# Patient Record
Sex: FEMALE | Race: WHITE | NOT HISPANIC OR LATINO | URBAN - METROPOLITAN AREA
[De-identification: names, ages, dates, MRNs, and addresses within clinical notes are randomized per-mention and may not be internally consistent; named-entity substitution may affect disease eponyms.]

---

## 2018-10-03 RX ORDER — NORETHINDRONE ACETATE AND ETHINYL ESTRADIOL AND FERROUS FUMARATE 1MG-20(21)
KIT ORAL
Qty: 84 TABLET | Refills: 2 | Status: SHIPPED | OUTPATIENT
Start: 2018-10-03 | End: 2018-10-03 | Stop reason: SDUPTHER

## 2018-10-04 RX ORDER — NORETHINDRONE ACETATE AND ETHINYL ESTRADIOL 1MG-20(21)
1 KIT ORAL
Qty: 84 TABLET | Refills: 2 | Status: SHIPPED | OUTPATIENT
Start: 2018-10-04 | End: 2019-07-23 | Stop reason: SDUPTHER

## 2018-10-04 NOTE — TELEPHONE ENCOUNTER
From: Jacquie Jewell  Sent: 10/3/2018 10:03 PM EDT  Subject: Medication Renewal Request    Jacquie Jewell would like a refill of the following medications:     JUNEL FE 1/20, 28, 1 mg-20 mcg (21)/75 mg (7) per tablet [Tammy Crowell DO]    Preferred pharmacy: [place] [address] [place] [address]  Delivery method: Pickup  Preferred pick-up date and time: 10/6/2018

## 2019-07-23 ENCOUNTER — OFFICE VISIT (OUTPATIENT)
Dept: FAMILY MEDICINE | Facility: CLINIC | Age: 29
End: 2019-07-23
Payer: COMMERCIAL

## 2019-07-23 VITALS
SYSTOLIC BLOOD PRESSURE: 104 MMHG | WEIGHT: 135.8 LBS | BODY MASS INDEX: 21.31 KG/M2 | TEMPERATURE: 98.2 F | HEART RATE: 90 BPM | OXYGEN SATURATION: 99 % | DIASTOLIC BLOOD PRESSURE: 68 MMHG | HEIGHT: 67 IN

## 2019-07-23 DIAGNOSIS — Z00.00 ENCOUNTER FOR GENERAL ADULT MEDICAL EXAMINATION WITHOUT ABNORMAL FINDINGS: Primary | ICD-10-CM

## 2019-07-23 DIAGNOSIS — Z11.3 SCREEN FOR SEXUALLY TRANSMITTED DISEASES: ICD-10-CM

## 2019-07-23 PROCEDURE — 99395 PREV VISIT EST AGE 18-39: CPT | Performed by: FAMILY MEDICINE

## 2019-07-23 RX ORDER — NORETHINDRONE ACETATE AND ETHINYL ESTRADIOL 1MG-20(21)
1 KIT ORAL
Qty: 84 TABLET | Refills: 2 | Status: SHIPPED | OUTPATIENT
Start: 2019-07-23 | End: 2019-10-17 | Stop reason: SDUPTHER

## 2019-07-23 ASSESSMENT — ENCOUNTER SYMPTOMS
VOMITING: 0
VOICE CHANGE: 0
ABDOMINAL PAIN: 0
NUMBNESS: 0
FEVER: 0
PALPITATIONS: 0
SHORTNESS OF BREATH: 0
COUGH: 0
CHILLS: 0
BACK PAIN: 0
DIZZINESS: 0
NERVOUS/ANXIOUS: 0
SORE THROAT: 0
HEMATURIA: 0
SINUS PAIN: 0
DIARRHEA: 0
EYE PAIN: 0
CONSTIPATION: 0
WHEEZING: 0
NAUSEA: 0
DYSURIA: 0

## 2019-07-23 NOTE — PROGRESS NOTES
"Subjective      Patient ID: Jacquie Jewell is a 29 y.o. female.      HPI  Patient presents today for physical exam.    Social History     Social History   • Marital status: Single     Spouse name: N/A   • Number of children: N/A   • Years of education: N/A     Occupational History   •       [employer]     Social History Main Topics   • Smoking status: Never Smoker   • Smokeless tobacco: Never Used   • Alcohol use Yes   • Drug use: No   • Sexual activity: Yes     Partners: Male      Comment: Pill Junell Fe     Other Topics Concern   • Not on file     Social History Narrative   • No narrative on file       Family History   Problem Relation Age of Onset   • Diabetes Maternal Grandmother    • Heart attack Maternal Grandmother    • Cancer Paternal Grandmother        History reviewed. No pertinent surgical history.    History reviewed. No pertinent past medical history.    Goals     None          The following have been reviewed and updated as appropriate in this visit:  Tobacco  Allergies  Meds  Problems  Med Hx  Surg Hx  Fam Hx  Soc Hx        Review of Systems   Constitutional: Negative for chills and fever.   HENT: Negative for ear pain, hearing loss, sinus pain, sneezing, sore throat and voice change.    Eyes: Negative for pain.   Respiratory: Negative for cough, shortness of breath and wheezing.    Cardiovascular: Negative for chest pain and palpitations.   Gastrointestinal: Negative for abdominal pain, constipation, diarrhea, nausea and vomiting.   Genitourinary: Negative for dysuria, hematuria, urgency and vaginal discharge.   Musculoskeletal: Negative for back pain.   Skin: Negative for rash.   Neurological: Negative for dizziness and numbness.   Psychiatric/Behavioral: The patient is not nervous/anxious.        Objective     Vitals:    07/23/19 0819   BP: 104/68   Pulse: 90   Temp: 36.8 °C (98.2 °F)   SpO2: 99%   Weight: 61.6 kg (135 lb 12.8 oz)   Height: 1.702 m (5' 7\")     Body mass " index is 21.27 kg/m².    Physical Exam   Constitutional: She is oriented to person, place, and time. She appears well-developed and well-nourished.   HENT:   Head: Normocephalic and atraumatic.   Right Ear: External ear normal.   Left Ear: External ear normal.   Nose: Nose normal.   Mouth/Throat: Oropharynx is clear and moist. No oropharyngeal exudate.   Eyes: Conjunctivae and EOM are normal.   Neck: Normal range of motion. Neck supple. No thyromegaly present.   Cardiovascular: Normal rate, regular rhythm and normal heart sounds.  Exam reveals no gallop and no friction rub.    No murmur heard.  Pulmonary/Chest: Effort normal and breath sounds normal. No respiratory distress. She has no wheezes. She has no rales.   Abdominal: Soft. Bowel sounds are normal. She exhibits no mass. There is no tenderness. There is no guarding.   Musculoskeletal: Normal range of motion. She exhibits no edema or tenderness.   Lymphadenopathy:     She has no cervical adenopathy.   Neurological: She is alert and oriented to person, place, and time. No cranial nerve deficit.   Skin: Skin is warm and dry. No rash noted. No erythema.   Psychiatric: She has a normal mood and affect.       Assessment/Plan   Problem List Items Addressed This Visit     None      Visit Diagnoses     Encounter for general adult medical examination without abnormal findings    -  Primary    counseled diet/nutrition and exercise    Relevant Orders    HIV 1,2 AB P24 AG    RPR    CBC and Differential    Comprehensive metabolic panel    Lipid panel    Screen for sexually transmitted diseases        Relevant Orders    HIV 1,2 AB P24 AG    RPR    CBC and Differential    Comprehensive metabolic panel    Lipid panel        Gyn - due - information for dr. arnold provided  tdap utd  Dentist - utd

## 2019-07-23 NOTE — PROGRESS NOTES
"Subjective      Patient ID: Jacquie Jewell is a 29 y.o. female.      HPI  Patient presents today for physical exam.    Social History     Social History   • Marital status: Single     Spouse name: N/A   • Number of children: N/A   • Years of education: N/A     Occupational History   •       [employer]     Social History Main Topics   • Smoking status: Never Smoker   • Smokeless tobacco: Never Used   • Alcohol use Yes   • Drug use: No   • Sexual activity: Yes     Partners: Male      Comment: Pill Junell Fe     Other Topics Concern   • Not on file     Social History Narrative   • No narrative on file       Family History   Problem Relation Age of Onset   • Diabetes Maternal Grandmother    • Heart attack Maternal Grandmother    • Cancer Paternal Grandmother        History reviewed. No pertinent surgical history.    History reviewed. No pertinent past medical history.    Goals     None          The following have been reviewed and updated as appropriate in this visit:  Tobacco  Med Hx  Surg Hx  Fam Hx  Soc Hx      Review of Systems   Constitutional: Negative for chills and fever.   HENT: Negative for ear pain, hearing loss, sinus pain, sneezing, sore throat and voice change.    Eyes: Negative for pain.   Respiratory: Negative for cough, shortness of breath and wheezing.    Cardiovascular: Negative for chest pain and palpitations.   Gastrointestinal: Negative for abdominal pain, constipation, diarrhea, nausea and vomiting.   Genitourinary: Negative for dysuria, hematuria, urgency and vaginal discharge.   Musculoskeletal: Negative for back pain.   Skin: Negative for rash.   Neurological: Negative for dizziness and numbness.   Psychiatric/Behavioral: The patient is not nervous/anxious.        Objective     Vitals:    07/23/19 0819   BP: 104/68   Pulse: 90   Temp: 36.8 °C (98.2 °F)   SpO2: 99%   Weight: 61.6 kg (135 lb 12.8 oz)   Height: 1.702 m (5' 7\")     Body mass index is 21.27 kg/m².    Physical " Exam   Constitutional: She is oriented to person, place, and time. She appears well-developed and well-nourished.   HENT:   Head: Normocephalic and atraumatic.   Right Ear: External ear normal.   Left Ear: External ear normal.   Nose: Nose normal.   Mouth/Throat: Oropharynx is clear and moist. No oropharyngeal exudate.   Eyes: Conjunctivae and EOM are normal.   Neck: Normal range of motion. Neck supple. No thyromegaly present.   Cardiovascular: Normal rate, regular rhythm and normal heart sounds.  Exam reveals no gallop and no friction rub.    No murmur heard.  Pulmonary/Chest: Effort normal and breath sounds normal. No respiratory distress. She has no wheezes. She has no rales.   Abdominal: Soft. Bowel sounds are normal. She exhibits no mass. There is no tenderness. There is no guarding.   Musculoskeletal: Normal range of motion. She exhibits no edema.   Lymphadenopathy:     She has no cervical adenopathy.   Neurological: She is alert and oriented to person, place, and time. No cranial nerve deficit.   Skin: Skin is warm and dry. No rash noted. No erythema.   Psychiatric: She has a normal mood and affect. Her behavior is normal.       Assessment/Plan   Problem List Items Addressed This Visit     None      Visit Diagnoses     Encounter for general adult medical examination without abnormal findings    -  Primary    Relevant Orders    HIV 1,2 AB P24 AG    RPR    CBC and Differential    Comprehensive metabolic panel    Lipid panel    Screen for sexually transmitted diseases        Relevant Orders    HIV 1,2 AB P24 AG    RPR    CBC and Differential    Comprehensive metabolic panel    Lipid panel        Gyn - due, information for dr. arnold provided  utd tdap  Dentist - utd

## 2019-07-23 NOTE — PATIENT INSTRUCTIONS
Dr. Haley Patterson  Address: [address] [place] [address] [id number], [id number]  Phone: [phone]

## 2019-08-07 LAB
ALBUMIN SERPL-MCNC: 4.4 G/DL (ref 3.5–5.5)
ALBUMIN/GLOB SERPL: 1.8 {RATIO} (ref 1.2–2.2)
ALP SERPL-CCNC: 41 IU/L (ref 39–117)
ALT SERPL-CCNC: 15 IU/L (ref 0–32)
AST SERPL-CCNC: 20 IU/L (ref 0–40)
BASOPHILS # BLD AUTO: 0 X10E3/UL (ref 0–0.2)
BASOPHILS NFR BLD AUTO: 1 %
BILIRUB SERPL-MCNC: 0.5 MG/DL (ref 0–1.2)
BUN SERPL-MCNC: 6 MG/DL (ref 6–20)
BUN/CREAT SERPL: 8 (ref 9–23)
CALCIUM SERPL-MCNC: 9.5 MG/DL (ref 8.7–10.2)
CHLORIDE SERPL-SCNC: 104 MMOL/L (ref 96–106)
CHOLEST SERPL-MCNC: 156 MG/DL (ref 100–199)
CO2 SERPL-SCNC: 23 MMOL/L (ref 20–29)
CREAT SERPL-MCNC: 0.72 MG/DL (ref 0.57–1)
EOSINOPHIL # BLD AUTO: 0.1 X10E3/UL (ref 0–0.4)
EOSINOPHIL NFR BLD AUTO: 1 %
ERYTHROCYTE [DISTWIDTH] IN BLOOD BY AUTOMATED COUNT: 13.2 % (ref 12.3–15.4)
GLOBULIN SER CALC-MCNC: 2.5 G/DL (ref 1.5–4.5)
GLUCOSE SERPL-MCNC: 87 MG/DL (ref 65–99)
HCT VFR BLD AUTO: 40.1 % (ref 34–46.6)
HDLC SERPL-MCNC: 53 MG/DL
HGB BLD-MCNC: 13.5 G/DL (ref 11.1–15.9)
HIV 1+2 AB+HIV1 P24 AG SERPL QL IA: NON REACTIVE
IMM GRANULOCYTES # BLD AUTO: 0 X10E3/UL (ref 0–0.1)
IMM GRANULOCYTES NFR BLD AUTO: 0 %
LAB CORP EGFR IF AFRICN AM: 130 ML/MIN/1.73
LAB CORP EGFR IF NONAFRICN AM: 113 ML/MIN/1.73
LDLC SERPL CALC-MCNC: 86 MG/DL (ref 0–99)
LYMPHOCYTES # BLD AUTO: 1.9 X10E3/UL (ref 0.7–3.1)
LYMPHOCYTES NFR BLD AUTO: 46 %
MCH RBC QN AUTO: 31.5 PG (ref 26.6–33)
MCHC RBC AUTO-ENTMCNC: 33.7 G/DL (ref 31.5–35.7)
MCV RBC AUTO: 94 FL (ref 79–97)
MONOCYTES # BLD AUTO: 0.3 X10E3/UL (ref 0.1–0.9)
MONOCYTES NFR BLD AUTO: 8 %
NEUTROPHILS # BLD AUTO: 1.8 X10E3/UL (ref 1.4–7)
NEUTROPHILS NFR BLD AUTO: 44 %
PLATELET # BLD AUTO: 306 X10E3/UL (ref 150–450)
POTASSIUM SERPL-SCNC: 4.9 MMOL/L (ref 3.5–5.2)
PROT SERPL-MCNC: 6.9 G/DL (ref 6–8.5)
RBC # BLD AUTO: 4.29 X10E6/UL (ref 3.77–5.28)
RPR SER QL: NON REACTIVE
SODIUM SERPL-SCNC: 138 MMOL/L (ref 134–144)
TRIGL SERPL-MCNC: 86 MG/DL (ref 0–149)
VLDLC SERPL CALC-MCNC: 17 MG/DL (ref 5–40)
WBC # BLD AUTO: 4.1 X10E3/UL (ref 3.4–10.8)

## 2019-10-17 ENCOUNTER — OFFICE VISIT (OUTPATIENT)
Dept: OBSTETRICS AND GYNECOLOGY | Facility: CLINIC | Age: 29
End: 2019-10-17
Payer: COMMERCIAL

## 2019-10-17 VITALS — DIASTOLIC BLOOD PRESSURE: 66 MMHG | BODY MASS INDEX: 20.86 KG/M2 | WEIGHT: 133.2 LBS | SYSTOLIC BLOOD PRESSURE: 124 MMHG

## 2019-10-17 DIAGNOSIS — Z12.4 ROUTINE CERVICAL SMEAR: ICD-10-CM

## 2019-10-17 DIAGNOSIS — Z11.51 SCREENING FOR HPV (HUMAN PAPILLOMAVIRUS): ICD-10-CM

## 2019-10-17 DIAGNOSIS — Z11.3 SCREENING EXAMINATION FOR STD (SEXUALLY TRANSMITTED DISEASE): ICD-10-CM

## 2019-10-17 DIAGNOSIS — Z01.419 WELL WOMAN EXAM WITH ROUTINE GYNECOLOGICAL EXAM: Primary | ICD-10-CM

## 2019-10-17 PROCEDURE — 30000001 HC MISCELLANEOUS TEST: Performed by: OBSTETRICS & GYNECOLOGY

## 2019-10-17 PROCEDURE — 87591 N.GONORRHOEAE DNA AMP PROB: CPT | Performed by: OBSTETRICS & GYNECOLOGY

## 2019-10-17 PROCEDURE — 87491 CHLMYD TRACH DNA AMP PROBE: CPT | Performed by: OBSTETRICS & GYNECOLOGY

## 2019-10-17 PROCEDURE — 87661 TRICHOMONAS VAGINALIS AMPLIF: CPT | Performed by: OBSTETRICS & GYNECOLOGY

## 2019-10-17 PROCEDURE — S0612 ANNUAL GYNECOLOGICAL EXAMINA: HCPCS | Performed by: OBSTETRICS & GYNECOLOGY

## 2019-10-17 PROCEDURE — 87624 HPV HI-RISK TYP POOLED RSLT: CPT | Performed by: OBSTETRICS & GYNECOLOGY

## 2019-10-17 PROCEDURE — G0145 SCR C/V CYTO,THINLAYER,RESCR: HCPCS | Performed by: OBSTETRICS & GYNECOLOGY

## 2019-10-17 RX ORDER — NORETHINDRONE ACETATE AND ETHINYL ESTRADIOL 1MG-20(21)
1 KIT ORAL
Qty: 84 TABLET | Refills: 3 | Status: SHIPPED | OUTPATIENT
Start: 2019-10-17 | End: 2020-08-11 | Stop reason: SDUPTHER

## 2019-10-17 NOTE — PROGRESS NOTES
10/17/2019  Jacquie Jewell is a 29 y.o. y.o. female who presents for annual exam.   Periods are absent on current ocp.  The patient is sexually active. GYN screening history: last pap: was normal. Domestic violence: no.     Current contraception: OCP (estrogen/progesterone)  History of abnormal Pap smear: no  Family history of uterine or ovarian cancer: no  Regular self breast exam: no  History of abnormal mammogram: no  Family history of breast cancer: yes - PGM- 50's ?  History of abnormal lipids: no  Menstrual History:  OB History     G0          No LMP recorded (lmp unknown).         Review of Systems and Physical Exam  The following have been reviewed and updated as appropriate in this visit: Allergies  Meds  Problems  .     Vitals:    10/17/19 0906   BP: 124/66     Weight: 133 pounds   HPI  30 yo G0 new patient presents for routine gyn annual exam who notes wanting STD workup on her pap smear. She had negative STD bloodwork.   Review of Systems  No HA/CP/SOB/abdominal/pelvic pain/dysuria/hematuria/incontinence constipation/diarrhea/vaginal discharge/dyspareunia/  OBGyn Exam  Neck: No thyromegaly or lumps  Heart: Regular rate and rhythm  Lungs: Clear to auscultation bilaterally   Abdomen: Soft, nontender, no masses  Extremities: No calf tenderness or edema  Speculum exam: No vaginal, vulvar, cervical lesions  Vaginal exam: Uterus normal size; no adnexal masses  Breast: No lumps; no lymphadenopathy    Assessment and Plan:  Diagnoses and all orders for this visit:    Well woman exam with routine gynecological exam  -     Cytology, Thinprep Pap  -     norethindrone-e.estradiol-iron (JUNEL FE 1/20, 28,) 1 mg-20 mcg (21)/75 mg (7) per tablet; Take 1 tablet by mouth once daily.  -     THINPREP PAP ([facility])    Routine cervical smear  -     Cytology, Thinprep Pap  -     THINPREP PAP ([facility])    Screening for HPV (human papillomavirus)  -     Cytology, Thinprep Pap  -     THINPREP PAP ([facility])    Screening examination for  STD (sexually transmitted disease)  -     Chlamydia/GC RNA:ThinPrep,Urine,Swab  -     Trichomonas Vaginalis RNA, QL    .  All questions answered..  Follow up in 1 year   Haley Patterson MD

## 2019-10-22 LAB
C TRACH RRNA SPEC QL NAA+PROBE: NOT DETECTED
N GONORRHOEA RRNA SPEC QL NAA+PROBE: NOT DETECTED
QST (ALWAYS MESSAGE): NORMAL
T VAGINALIS RRNA SPEC QL NAA+PROBE: NOT DETECTED

## 2019-10-28 LAB
CASE RPRT: NORMAL
CLINICAL INFO: NORMAL
CLINICAL INFO: NORMAL
LMP START DATE: NORMAL
SPECIMEN PROCESSING COMMENT: NORMAL
THIN PREP CVX: NORMAL

## 2019-10-29 LAB
HPV HR 12 DNA CVX QL NAA+PROBE: NEGATIVE
HPV16 DNA SPEC QL NAA+PROBE: NEGATIVE
HPV18 DNA SPEC QL NAA+PROBE: NEGATIVE

## 2020-03-12 ENCOUNTER — OFFICE VISIT (OUTPATIENT)
Dept: FAMILY MEDICINE | Facility: CLINIC | Age: 30
End: 2020-03-12
Payer: COMMERCIAL

## 2020-03-12 VITALS
HEART RATE: 120 BPM | TEMPERATURE: 99.3 F | HEIGHT: 67 IN | BODY MASS INDEX: 21.53 KG/M2 | OXYGEN SATURATION: 99 % | WEIGHT: 137.2 LBS

## 2020-03-12 DIAGNOSIS — J30.9 ALLERGIC RHINITIS, UNSPECIFIED SEASONALITY, UNSPECIFIED TRIGGER: Primary | ICD-10-CM

## 2020-03-12 PROCEDURE — 99213 OFFICE O/P EST LOW 20 MIN: CPT | Performed by: FAMILY MEDICINE

## 2020-03-12 NOTE — PROGRESS NOTES
Subjective      Patient ID: Jacquie Jewell is a 29 y.o. female.    HPI     Went to [employer]. show 10 - 12 days ago  Dry cough started yesterday - new office building that was [place]  Talking to her mom yesterday on the phone and noticed was getting short of breath, no other episodes  No hx of asthma or ever needing an inhaler  No fever/chills  Doesn't usually suffer from seasonal allergies    The following have been reviewed and updated as appropriate in this visit:  Allergies  Meds  Problems       Review of Systems   Constitutional: Negative for chills, fatigue and fever.   HENT: Negative for congestion, ear pain, postnasal drip, rhinorrhea, sinus pressure, sinus pain and sore throat.    Respiratory: Positive for cough and shortness of breath.    Cardiovascular: Negative for chest pain.       Social History     Socioeconomic History   • Marital status: Single     Spouse name: Not on file   • Number of children: Not on file   • Years of education: Not on file   • Highest education level: Not on file   Occupational History   • Occupation:      Comment: [employer]   Social Needs   • Financial resource strain: Not on file   • Food insecurity:     Worry: Not on file     Inability: Not on file   • Transportation needs:     Medical: Not on file     Non-medical: Not on file   Tobacco Use   • Smoking status: Never Smoker   • Smokeless tobacco: Never Used   Substance and Sexual Activity   • Alcohol use: Yes   • Drug use: No   • Sexual activity: Yes     Partners: Male     Comment: Pill Junell Fe   Lifestyle   • Physical activity:     Days per week: Not on file     Minutes per session: Not on file   • Stress: Not on file   Relationships   • Social connections:     Talks on phone: Not on file     Gets together: Not on file     Attends Mandaeism service: Not on file     Active member of club or organization: Not on file     Attends meetings of clubs or organizations: Not on file     Relationship status:  "Not on file   • Intimate partner violence:     Fear of current or ex partner: Not on file     Emotionally abused: Not on file     Physically abused: Not on file     Forced sexual activity: Not on file   Other Topics Concern   • Not on file   Social History Narrative   • Not on file       Objective     Vitals:    03/12/20 1733   Pulse: (!) 120   Temp: 37.4 °C (99.3 °F)   SpO2: 99%   Weight: 62.2 kg (137 lb 3.2 oz)   Height: 1.702 m (5' 7\")     Body mass index is 21.49 kg/m².    Physical Exam   Constitutional: She is oriented to person, place, and time. She appears well-developed and well-nourished.   HENT:   Head: Normocephalic and atraumatic.   Right Ear: Tympanic membrane, external ear and ear canal normal.   Left Ear: Tympanic membrane, external ear and ear canal normal.   Nose: No rhinorrhea or sinus tenderness. Right sinus exhibits no maxillary sinus tenderness and no frontal sinus tenderness. Left sinus exhibits no maxillary sinus tenderness and no frontal sinus tenderness.   Mouth/Throat: Oropharynx is clear and moist. No oropharyngeal exudate.   Turbinates with clear discharge and moderate enlargement and erythema  Negative tenderness to palpation over the sinuses     Cardiovascular: Normal rate, regular rhythm and normal heart sounds. Exam reveals no gallop and no friction rub.   No murmur heard.  Pulmonary/Chest: Effort normal and breath sounds normal. No stridor. No respiratory distress. She has no wheezes.   Neurological: She is alert and oriented to person, place, and time.   Skin: Skin is warm and dry.   Psychiatric: She has a normal mood and affect. Her behavior is normal.   Vitals reviewed.      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

## 2020-03-13 ASSESSMENT — ENCOUNTER SYMPTOMS
SINUS PRESSURE: 0
RHINORRHEA: 0
FATIGUE: 0
CHILLS: 0
SINUS PAIN: 0
SORE THROAT: 0
SHORTNESS OF BREATH: 1
COUGH: 1
FEVER: 0

## 2020-08-11 DIAGNOSIS — Z01.419 WELL WOMAN EXAM WITH ROUTINE GYNECOLOGICAL EXAM: ICD-10-CM

## 2020-08-11 RX ORDER — NORETHINDRONE ACETATE AND ETHINYL ESTRADIOL 1MG-20(21)
1 KIT ORAL
Qty: 84 TABLET | Refills: 0 | Status: SHIPPED | OUTPATIENT
Start: 2020-08-11 | End: 2020-10-30 | Stop reason: SDUPTHER

## 2020-10-29 ENCOUNTER — TELEPHONE (OUTPATIENT)
Dept: OBSTETRICS AND GYNECOLOGY | Facility: CLINIC | Age: 30
End: 2020-10-29

## 2020-10-30 DIAGNOSIS — Z01.419 WELL WOMAN EXAM WITH ROUTINE GYNECOLOGICAL EXAM: ICD-10-CM

## 2020-10-30 RX ORDER — NORETHINDRONE ACETATE AND ETHINYL ESTRADIOL 1MG-20(21)
1 KIT ORAL
Qty: 84 TABLET | Refills: 0 | Status: SHIPPED | OUTPATIENT
Start: 2020-10-30 | End: 2021-01-19

## 2021-01-19 DIAGNOSIS — Z01.419 WELL WOMAN EXAM WITH ROUTINE GYNECOLOGICAL EXAM: ICD-10-CM

## 2021-01-19 RX ORDER — NORETHINDRONE ACETATE AND ETHINYL ESTRADIOL AND FERROUS FUMARATE 1MG-20(21)
KIT ORAL
Qty: 84 TABLET | Refills: 0 | Status: SHIPPED | OUTPATIENT
Start: 2021-01-19 | End: 2021-02-04 | Stop reason: SDUPTHER

## 2021-02-05 ENCOUNTER — OFFICE VISIT (OUTPATIENT)
Dept: OBSTETRICS AND GYNECOLOGY | Facility: CLINIC | Age: 31
End: 2021-02-05
Payer: COMMERCIAL

## 2021-02-05 VITALS
DIASTOLIC BLOOD PRESSURE: 84 MMHG | WEIGHT: 140.2 LBS | HEIGHT: 67 IN | SYSTOLIC BLOOD PRESSURE: 128 MMHG | BODY MASS INDEX: 22 KG/M2

## 2021-02-05 DIAGNOSIS — Z11.51 SCREENING FOR HPV (HUMAN PAPILLOMAVIRUS): ICD-10-CM

## 2021-02-05 DIAGNOSIS — Z01.419 WELL WOMAN EXAM WITH ROUTINE GYNECOLOGICAL EXAM: Primary | ICD-10-CM

## 2021-02-05 DIAGNOSIS — Z12.4 ROUTINE CERVICAL SMEAR: ICD-10-CM

## 2021-02-05 DIAGNOSIS — N90.89 VULVAR SKIN TAG: ICD-10-CM

## 2021-02-05 PROCEDURE — 87624 HPV HI-RISK TYP POOLED RSLT: CPT | Performed by: OBSTETRICS & GYNECOLOGY

## 2021-02-05 PROCEDURE — 99395 PREV VISIT EST AGE 18-39: CPT | Performed by: OBSTETRICS & GYNECOLOGY

## 2021-02-05 PROCEDURE — G0145 SCR C/V CYTO,THINLAYER,RESCR: HCPCS | Performed by: OBSTETRICS & GYNECOLOGY

## 2021-02-05 RX ORDER — NORETHINDRONE ACETATE AND ETHINYL ESTRADIOL 1MG-20(21)
1 KIT ORAL
Qty: 84 TABLET | Refills: 3 | Status: SHIPPED | OUTPATIENT
Start: 2021-02-05 | End: 2021-07-02 | Stop reason: SDUPTHER

## 2021-02-05 NOTE — PROGRESS NOTES
2/5/2021  Jacquie Jewell is a 30 y.o. y.o. female who presents for annual exam.   Periods are absent on ocps.  The patient is sexually active. GYN screening history: last pap: was normal. Domestic violence: no.     Current contraception: OCP (estrogen/progesterone)  History of abnormal Pap smear: no  Family history of uterine or ovarian cancer: no  Regular self breast exam: no  History of abnormal mammogram: no  Family history of breast cancer: yes - PGM- 50's  History of abnormal lipids: no  Menstrual History:  OB History     G0          No LMP recorded. (Menstrual status: Amenorrhea).         Review of Systems and Physical Exam  The following have been reviewed and updated as appropriate in this visit: Allergies  Meds  Problems  .     Vitals:    02/05/21 1018   BP: 128/84     Weight: 140 pounds   HPI  29 yo G0 presents for routine gyn annual exam who noticed some bumps between her legs x 5-6 months that not painful or itchy. She does have a history of skin tags on other area of her body.   Review of Systems  No HA/CP/SOB/abdominal/pelvic pain/dysuria/hematuria/incontinence constipation/diarrhea/vaginal discharge/dyspareunia/  OBGyn Exam  Neck: No thyromegaly or lumps  Heart: Regular rate and rhythm  Lungs: Clear to auscultation bilaterally   Abdomen: Soft, nontender, no masses  Extremities: No calf tenderness or edema  Speculum exam: No vaginal,or cervical lesions; + left groin skin tags x 3 (smooth texture)  Vaginal exam: Uterus normal size; no adnexal masses  Breast: No lumps; no lymphadenopathy    Assessment and Plan:  Diagnoses and all orders for this visit:    Well woman exam with routine gynecological exam  -     norethindrone-e.estradioL-iron (JUNEL FE 1/20, 28,) 1 mg-20 mcg (21)/75 mg (7) per tablet; Take 1 tablet by mouth once daily for 28 days.    Routine cervical smear  -     Cytology, Thinprep Pap  -     THINPREP PAP ([facility])    Screening for HPV (human papillomavirus)  -     Cytology, Thinprep  Pap  -     THINPREP PAP ([facility])    Vulvar skin tag    .pt declined intervention for now due to low suspicion of genital warts- pt will call back if skin tags are growing or if symptomatic   All questions answered..  Follow up 1 year   Haley Patterson MD

## 2021-02-15 LAB
CASE RPRT: NORMAL
CLINICAL INFO: NORMAL
CLINICAL INFO: NORMAL
HPV HR 12 DNA CVX QL NAA+PROBE: NEGATIVE
HPV16 DNA SPEC QL NAA+PROBE: NEGATIVE
HPV18 DNA SPEC QL NAA+PROBE: NEGATIVE
LMP START DATE: NORMAL
SPECIMEN PROCESSING COMMENT: NORMAL
THIN PREP CVX: NORMAL

## 2022-07-01 DIAGNOSIS — Z01.419 WELL WOMAN EXAM WITH ROUTINE GYNECOLOGICAL EXAM: ICD-10-CM

## 2022-07-03 DIAGNOSIS — Z01.419 WELL WOMAN EXAM WITH ROUTINE GYNECOLOGICAL EXAM: ICD-10-CM

## 2022-07-03 RX ORDER — NORETHINDRONE ACETATE AND ETHINYL ESTRADIOL 1MG-20(21)
1 KIT ORAL
Qty: 28 TABLET | Refills: 0 | Status: SHIPPED | OUTPATIENT
Start: 2022-07-03 | End: 2022-07-05

## 2022-07-05 RX ORDER — NORETHINDRONE ACETATE AND ETHINYL ESTRADIOL AND FERROUS FUMARATE 1MG-20(21)
KIT ORAL
Qty: 84 TABLET | Refills: 2 | Status: SHIPPED | OUTPATIENT
Start: 2022-07-05 | End: 2022-08-11 | Stop reason: SDUPTHER

## 2022-08-11 ENCOUNTER — OFFICE VISIT (OUTPATIENT)
Dept: OBSTETRICS AND GYNECOLOGY | Facility: CLINIC | Age: 32
End: 2022-08-11
Payer: COMMERCIAL

## 2022-08-11 VITALS
WEIGHT: 146 LBS | BODY MASS INDEX: 22.13 KG/M2 | DIASTOLIC BLOOD PRESSURE: 70 MMHG | SYSTOLIC BLOOD PRESSURE: 110 MMHG | HEIGHT: 68 IN

## 2022-08-11 DIAGNOSIS — Z01.419 WELL WOMAN EXAM WITH ROUTINE GYNECOLOGICAL EXAM: Primary | ICD-10-CM

## 2022-08-11 PROCEDURE — 99395 PREV VISIT EST AGE 18-39: CPT | Performed by: OBSTETRICS & GYNECOLOGY

## 2022-08-11 PROCEDURE — 3008F BODY MASS INDEX DOCD: CPT | Performed by: OBSTETRICS & GYNECOLOGY

## 2022-08-11 RX ORDER — NORETHINDRONE ACETATE AND ETHINYL ESTRADIOL 1MG-20(21)
1 KIT ORAL
Qty: 84 TABLET | Refills: 3 | Status: SHIPPED | OUTPATIENT
Start: 2022-08-11 | End: 2023-09-05

## 2022-08-11 NOTE — PROGRESS NOTES
8/11/2022  Jacquie Jewell is a 32 y.o. y.o. female who presents for annual exam.   Periods are absent due to ocp    The patient is sexually active. GYN screening history: last pap: was normal. Domestic violence: no.     Current contraception: OCP (estrogen/progesterone)  History of abnormal Pap smear: no  Family history of uterine or ovarian cancer: no  Regular self breast exam: no  History of abnormal mammogram: no  Family history of breast cancer: yes - PGM- 50's  History of abnormal lipids: no  Pap smear: 2/2021 negative cells and HPV    Menstrual History:  OB History     G0          No LMP recorded (lmp unknown). (Menstrual status: Amenorrhea).         Review of Systems and Physical Exam  The following have been reviewed and updated as appropriate in this visit: Allergies  Meds  Problems  .     Vitals:    08/11/22 1253   BP: 110/70     Weight: 146 pounds   HPI  31 yo G0 presents for routine gyn annual exam who denies any issues or concerns at this time.   Review of Systems  No HA/CP/SOB/abdominal/pelvic pain/dysuria/hematuria/incontinence constipation/diarrhea/vaginal discharge/dyspareunia/  OBGyn Exam  Neck: No thyromegaly or lumps  Heart: Regular rate and rhythm  Lungs: Clear to auscultation bilaterally   Abdomen: Soft, nontender, no masses  Extremities: No calf tenderness or edema  Speculum exam: No vaginal, vulvar, cervical lesions  Vaginal exam: Uterus normal size; no adnexal masses  Breast: No lumps; no lymphadenopathy    Assessment and Plan:  Diagnoses and all orders for this visit:    Well woman exam with routine gynecological exam  -     norethindrone-e.estradioL-iron (JUNEL FE 1/20, 28,) 1 mg-20 mcg (21)/75 mg (7) per tablet; Take 1 tablet by mouth once daily.    .pap smear not indicated this year   All questions answered..  Follow up in 1 year   Haley Patterson MD

## 2023-02-24 ENCOUNTER — OFFICE VISIT (OUTPATIENT)
Dept: FAMILY MEDICINE | Facility: CLINIC | Age: 33
End: 2023-02-24
Payer: COMMERCIAL

## 2023-02-24 VITALS
BODY MASS INDEX: 22.82 KG/M2 | HEIGHT: 67 IN | DIASTOLIC BLOOD PRESSURE: 62 MMHG | WEIGHT: 145.4 LBS | TEMPERATURE: 98 F | HEART RATE: 95 BPM | SYSTOLIC BLOOD PRESSURE: 118 MMHG | OXYGEN SATURATION: 100 %

## 2023-02-24 DIAGNOSIS — Z00.00 ENCOUNTER FOR PREVENTIVE CARE: Primary | ICD-10-CM

## 2023-02-24 DIAGNOSIS — Z13.220 SCREENING, LIPID: ICD-10-CM

## 2023-02-24 DIAGNOSIS — Z76.89 ESTABLISHING CARE WITH NEW DOCTOR, ENCOUNTER FOR: ICD-10-CM

## 2023-02-24 DIAGNOSIS — Z30.41 ORAL CONTRACEPTIVE PILL SURVEILLANCE: ICD-10-CM

## 2023-02-24 PROCEDURE — 99395 PREV VISIT EST AGE 18-39: CPT | Performed by: STUDENT IN AN ORGANIZED HEALTH CARE EDUCATION/TRAINING PROGRAM

## 2023-02-24 PROCEDURE — 3008F BODY MASS INDEX DOCD: CPT | Performed by: STUDENT IN AN ORGANIZED HEALTH CARE EDUCATION/TRAINING PROGRAM

## 2023-02-24 NOTE — PROGRESS NOTES
Subjective      Patient ID: Jacquie Jewell is a 33 y.o. female here for the following:   Establish Care      HPI    SUBJECTIVE:   33 y.o. female for annual routine checkup.    Specific diet: Standard American diet  Exercise program: No formal routine   Sleep hours per night: Adequate  Dentist twice yearly: Yes  Optometrist or Opthalmologist annually?  Yes    Vaccines:  COVID? No problems    Do you wear your seatbelt? y  Do you wear sunscreen? y  Smoke detectors? y  Feel safe in your home? y    History of abnormal pap?  No   Did you receive HPV vaccine? y     Desire STI testing? No     History of abnormal mammogram? NA     Immunization History   Administered Date(s) Administered   • Influenza Vaccine Quadrivalent Preservative Free 3 Yr And Up 10/10/2020   • Influenza, Unspecified 10/06/2021   • SARS-COV-2 (COVID19) VACCINE, PFIZER 2021, 2021   • Tdap 2016      Menstrual History:  OB History        0    Para   0    Term   0       0    AB   0    Living   0       SAB   0    IAB   0    Ectopic   0    Multiple   0    Live Births   0                No LMP recorded. (Menstrual status: Amenorrhea).       The following have been reviewed and updated as appropriate in this visit:      Allergies  Meds  Problems  Social History      Patient Active Problem List   Diagnosis   • Encounter for preventive care    .    Social History     Tobacco Use   • Smoking status: Never   • Smokeless tobacco: Never   Vaping Use   • Vaping status: Never Used   Substance Use Topics   • Alcohol use: Yes     Comment: occasionally   • Drug use: No     Social History     Social History Narrative   • Not on file       Family History   Problem Relation Age of Onset   • Sjogren's syndrome Biological Mother         trying to get into   • Diabetes Maternal Grandmother    • Heart attack Maternal Grandmother    • Cancer Paternal Grandmother    • Breast cancer Paternal Grandmother    • Colon cancer Neg Hx    • Ovarian cancer  "Neg Hx        Allergies as of 02/24/2023 - Reviewed 02/24/2023   Allergen Reaction Noted   • Cephalexin Hives        Current Outpatient Medications   Medication Sig Dispense Refill   • norethindrone-e.estradioL-iron (JUNEL FE 1/20, 28,) 1 mg-20 mcg (21)/75 mg (7) per tablet Take 1 tablet by mouth once daily. 84 tablet 3     No current facility-administered medications for this visit.         Review of Systems    ROS:  Feeling well. No dyspnea or chest pain on exertion.  No abdominal pain, change in bowel habits, black or bloody stools.  No urinary tract symptoms. No changes to hair, skin, or nails. No neurological complaints.    Objective   Vitals:   Vitals:    02/24/23 1456   BP: 118/62   BP Location: Left upper arm   Patient Position: Sitting   Pulse: 95   Temp: 36.7 °C (98 °F)   TempSrc: Temporal   SpO2: 100%   Weight: 66 kg (145 lb 6.4 oz)   Height: 1.71 m (5' 7.32\")     Body mass index is 22.55 kg/m².  No LMP recorded. (Menstrual status: Amenorrhea).    No results found.    Physical Exam    The patient appears well, alert, oriented x 3, in no distress.  ENT normal.  Neck supple. No adenopathy or thyromegaly.  FARNAZ. Sclera anicteric.  No ocular discharge, pink conjunctiva  Lungs are clear, good air entry, no wheezes, rhonchi or rales.   S1 and S2 normal, no murmurs, regular rate and rhythm.  Abdomen soft without tenderness, guarding, mass or organomegaly.   Extremities show no edema, normal peripheral pulses.   Neurological is normal, no focal findings. CN 2-12 intact. Moves all extremities spontaneously.    Psych: pleasant and cooperative    ASSESSMENT & PLAN      Problem List Items Addressed This Visit        Other    Encounter for preventive care - Primary     ASSESSMENT:   well woman    PLAN:   Mammogram at 38 yo   CRC screen at 43 yo   pap smear- per ob   counseled on breast self exam and family planning choices  additional lab tests per orders  return annually or prn  Counseled on importance of regular " exercise including both cardio and resistance training and necessity of varied diet and regular exercise           Relevant Orders    CBC and differential (Completed)    Lipid panel (Completed)    Comprehensive metabolic panel (Completed)   Other Visit Diagnoses     Establishing care with new doctor, encounter for        Oral contraceptive pill surveillance        Relevant Orders    CBC and differential (Completed)    Comprehensive metabolic panel (Completed)    Screening, lipid        Relevant Orders    Lipid panel (Completed)        Patient Instructions   Diet: Eat a balanced diet of fruits, vegetables, and protein such as lean meats, fish, nuts, beans, quinoa. Limit your intake of carbs such as bread and pasta, and when you do eat these, try to stick with whole grains with higher amounts of fiber. When cooking, try to use olive oil instead of butter, which is high in saturated fat.     Goal Exercise: Aim to exercise 3-5 days per week for 30-60 minutes at moderate intensity. Some suggestions include cardio, weight training, and swimming, but the most important thing is flexibility: do whatever you like and enjoy so that you will keep doing it. The recommended minimums for weekly aerobic activity include 150 minutes of  moderate-intensity aerobic activity such as brisk walking, 75 minutes of vigorous-intensity aerobic activity such as jogging or running, or an equivalent mix of moderate- and vigorous-intensity aerobic activity. In addition, muscle-strengthening activities that work all major muscle groups (legs, hips, back, abdomen, chest, shoulders, and arms) should be performed 2 or more days/week.    Skin: Always use sunscreen with SPF>15 when exposed to the sun. Try to avoid prolonged exposure. Check skin moles regularly, and you may want to see a dermatologist regularly.    Health maintenance: Visit your dentist every 6 months for cleanings and dental exams. Visit your optometrist or ophthalmologist every year  for an eye exam.    Safety: seatbelts, helmets, and no texting while driving.        _____________________  Karen Lobo,   02/24/23

## 2023-02-24 NOTE — PATIENT INSTRUCTIONS
Diet: Eat a balanced diet of fruits, vegetables, and protein such as lean meats, fish, nuts, beans, quinoa. Limit your intake of carbs such as bread and pasta, and when you do eat these, try to stick with whole grains with higher amounts of fiber. When cooking, try to use olive oil instead of butter, which is high in saturated fat.     Goal Exercise: Aim to exercise 3-5 days per week for 30-60 minutes at moderate intensity. Some suggestions include cardio, weight training, and swimming, but the most important thing is flexibility: do whatever you like and enjoy so that you will keep doing it. The recommended minimums for weekly aerobic activity include 150 minutes of  moderate-intensity aerobic activity such as brisk walking, 75 minutes of vigorous-intensity aerobic activity such as jogging or running, or an equivalent mix of moderate- and vigorous-intensity aerobic activity. In addition, muscle-strengthening activities that work all major muscle groups (legs, hips, back, abdomen, chest, shoulders, and arms) should be performed 2 or more days/week.    Skin: Always use sunscreen with SPF>15 when exposed to the sun. Try to avoid prolonged exposure. Check skin moles regularly, and you may want to see a dermatologist regularly.    Health maintenance: Visit your dentist every 6 months for cleanings and dental exams. Visit your optometrist or ophthalmologist every year for an eye exam.    Safety: seatbelts, helmets, and no texting while driving.

## 2023-03-07 LAB
BASOPHILS # BLD AUTO: 0.1 X10E3/UL (ref 0–0.2)
BASOPHILS NFR BLD AUTO: 1 %
EOSINOPHIL # BLD AUTO: 0.1 X10E3/UL (ref 0–0.4)
EOSINOPHIL NFR BLD AUTO: 2 %
ERYTHROCYTE [DISTWIDTH] IN BLOOD BY AUTOMATED COUNT: 12.2 % (ref 11.7–15.4)
HCT VFR BLD AUTO: 39.7 % (ref 34–46.6)
HGB BLD-MCNC: 13.8 G/DL (ref 11.1–15.9)
IMM GRANULOCYTES # BLD AUTO: 0 X10E3/UL (ref 0–0.1)
IMM GRANULOCYTES NFR BLD AUTO: 0 %
LYMPHOCYTES # BLD AUTO: 1.9 X10E3/UL (ref 0.7–3.1)
LYMPHOCYTES NFR BLD AUTO: 38 %
MCH RBC QN AUTO: 31.4 PG (ref 26.6–33)
MCHC RBC AUTO-ENTMCNC: 34.8 G/DL (ref 31.5–35.7)
MCV RBC AUTO: 90 FL (ref 79–97)
MONOCYTES # BLD AUTO: 0.4 X10E3/UL (ref 0.1–0.9)
MONOCYTES NFR BLD AUTO: 7 %
NEUTROPHILS # BLD AUTO: 2.7 X10E3/UL (ref 1.4–7)
NEUTROPHILS NFR BLD AUTO: 52 %
PLATELET # BLD AUTO: 241 X10E3/UL (ref 150–450)
RBC # BLD AUTO: 4.4 X10E6/UL (ref 3.77–5.28)
WBC # BLD AUTO: 5.1 X10E3/UL (ref 3.4–10.8)

## 2023-03-08 LAB
ALBUMIN SERPL-MCNC: 4.3 G/DL (ref 3.8–4.8)
ALBUMIN/GLOB SERPL: 1.9 {RATIO} (ref 1.2–2.2)
ALP SERPL-CCNC: 50 IU/L (ref 44–121)
ALT SERPL-CCNC: 13 IU/L (ref 0–32)
AST SERPL-CCNC: 18 IU/L (ref 0–40)
BILIRUB SERPL-MCNC: 0.3 MG/DL (ref 0–1.2)
BUN SERPL-MCNC: 9 MG/DL (ref 6–20)
BUN/CREAT SERPL: 13 (ref 9–23)
CALCIUM SERPL-MCNC: 9.2 MG/DL (ref 8.7–10.2)
CHLORIDE SERPL-SCNC: 106 MMOL/L (ref 96–106)
CHOLEST SERPL-MCNC: 174 MG/DL (ref 100–199)
CO2 SERPL-SCNC: 22 MMOL/L (ref 20–29)
CREAT SERPL-MCNC: 0.71 MG/DL (ref 0.57–1)
EGFRCR SERPLBLD CKD-EPI 2021: 115 ML/MIN/1.73
GLOBULIN SER CALC-MCNC: 2.3 G/DL (ref 1.5–4.5)
GLUCOSE SERPL-MCNC: 82 MG/DL (ref 70–99)
HDLC SERPL-MCNC: 47 MG/DL
LDLC SERPL CALC-MCNC: 110 MG/DL (ref 0–99)
POTASSIUM SERPL-SCNC: 4.2 MMOL/L (ref 3.5–5.2)
PROT SERPL-MCNC: 6.6 G/DL (ref 6–8.5)
SODIUM SERPL-SCNC: 140 MMOL/L (ref 134–144)
TRIGL SERPL-MCNC: 90 MG/DL (ref 0–149)
VLDLC SERPL CALC-MCNC: 17 MG/DL (ref 5–40)

## 2023-03-09 ENCOUNTER — TELEPHONE (OUTPATIENT)
Dept: FAMILY MEDICINE | Facility: CLINIC | Age: 33
End: 2023-03-09
Payer: COMMERCIAL

## 2023-03-09 DIAGNOSIS — Z00.00 ENCOUNTER FOR PREVENTIVE CARE: Primary | ICD-10-CM

## 2023-03-09 DIAGNOSIS — Z11.3 ROUTINE SCREENING FOR STI (SEXUALLY TRANSMITTED INFECTION): ICD-10-CM

## 2023-03-09 NOTE — TELEPHONE ENCOUNTER
Please call and see if we can add on HIV and hepatitis C testing thank you    Diagnoses and all orders for this visit:    Encounter for preventive care (Primary)  -     HIV 1,2 AB P24 AG; Future  -     Hepatitis C antibody; Future    Routine screening for STI (sexually transmitted infection)  -     HIV 1,2 AB P24 AG; Future  -     Hepatitis C antibody; Future

## 2023-03-10 LAB — HCV IGG SERPL QL IA: NON REACTIVE

## 2023-03-27 ENCOUNTER — APPOINTMENT (OUTPATIENT)
Dept: URBAN - METROPOLITAN AREA CLINIC 203 | Age: 33
Setting detail: DERMATOLOGY
End: 2023-03-29

## 2023-03-27 DIAGNOSIS — L91.8 OTHER HYPERTROPHIC DISORDERS OF THE SKIN: ICD-10-CM

## 2023-03-27 DIAGNOSIS — L82.1 OTHER SEBORRHEIC KERATOSIS: ICD-10-CM

## 2023-03-27 DIAGNOSIS — L70.0 ACNE VULGARIS: ICD-10-CM

## 2023-03-27 DIAGNOSIS — L82.0 INFLAMED SEBORRHEIC KERATOSIS: ICD-10-CM

## 2023-03-27 PROCEDURE — OTHER COUNSELING: OTHER

## 2023-03-27 PROCEDURE — OTHER DEFER: OTHER

## 2023-03-27 PROCEDURE — OTHER PRESCRIPTION: OTHER

## 2023-03-27 PROCEDURE — 99204 OFFICE O/P NEW MOD 45 MIN: CPT | Mod: 25

## 2023-03-27 PROCEDURE — 17110 DESTRUCT B9 LESION 1-14: CPT

## 2023-03-27 PROCEDURE — OTHER PRESCRIPTION MEDICATION MANAGEMENT: OTHER

## 2023-03-27 PROCEDURE — OTHER LIQUID NITROGEN: OTHER

## 2023-03-27 RX ORDER — AZELAIC ACID 0.15 G/G
GEL TOPICAL QD
Qty: 50 | Refills: 3 | Status: ERX | COMMUNITY
Start: 2023-03-27

## 2023-03-27 ASSESSMENT — LOCATION DETAILED DESCRIPTION DERM
LOCATION DETAILED: SUPERIOR MID FOREHEAD
LOCATION DETAILED: RIGHT FOREHEAD
LOCATION DETAILED: LEFT FOREHEAD
LOCATION DETAILED: RIGHT POPLITEAL SKIN
LOCATION DETAILED: LEFT INFERIOR CENTRAL MALAR CHEEK
LOCATION DETAILED: LEFT LATERAL CANTHUS

## 2023-03-27 ASSESSMENT — PAIN INTENSITY VAS: HOW INTENSE IS YOUR PAIN 0 BEING NO PAIN, 10 BEING THE MOST SEVERE PAIN POSSIBLE?: NO PAIN

## 2023-03-27 ASSESSMENT — LOCATION SIMPLE DESCRIPTION DERM
LOCATION SIMPLE: RIGHT FOREHEAD
LOCATION SIMPLE: LEFT EYELID
LOCATION SIMPLE: SUPERIOR FOREHEAD
LOCATION SIMPLE: LEFT CHEEK
LOCATION SIMPLE: RIGHT POPLITEAL SKIN
LOCATION SIMPLE: LEFT FOREHEAD

## 2023-03-27 ASSESSMENT — LOCATION ZONE DERM
LOCATION ZONE: EYELID
LOCATION ZONE: FACE
LOCATION ZONE: LEG

## 2023-03-27 NOTE — PROCEDURE: DEFER
Size Of Lesion In Cm (Optional): 0
Detail Level: Detailed
Introduction Text (Please End With A Colon): The following procedure was deferred:
Instructions (Optional): Cosmetic destruction with cautery

## 2023-03-27 NOTE — PROCEDURE: PRESCRIPTION MEDICATION MANAGEMENT
Plan: Sending rx azelaic acid topical gel. Apply pea size amount qd to face. \\n\\nPt will try topical foam sample
Initiate Treatment: Azelaic acid topical gel
Detail Level: Zone
Render In Strict Bullet Format?: No
Samples Given: Finacea, la roche posay matte

## 2023-03-31 LAB — SPECIMEN STATUS: NORMAL

## 2023-04-19 NOTE — ASSESSMENT & PLAN NOTE
ASSESSMENT:   well woman    PLAN:   Mammogram at 38 yo   CRC screen at 43 yo   pap smear- per ob   counseled on breast self exam and family planning choices  additional lab tests per orders  return annually or prn  Counseled on importance of regular exercise including both cardio and resistance training and necessity of varied diet and regular exercise

## 2023-04-20 ENCOUNTER — APPOINTMENT (OUTPATIENT)
Dept: URBAN - METROPOLITAN AREA CLINIC 203 | Age: 33
Setting detail: DERMATOLOGY
End: 2023-04-23

## 2023-04-20 DIAGNOSIS — L91.8 OTHER HYPERTROPHIC DISORDERS OF THE SKIN: ICD-10-CM

## 2023-04-20 DIAGNOSIS — L82.0 INFLAMED SEBORRHEIC KERATOSIS: ICD-10-CM

## 2023-04-20 PROCEDURE — OTHER BENIGN DESTRUCTION COSMETIC: OTHER

## 2023-04-20 ASSESSMENT — LOCATION DETAILED DESCRIPTION DERM
LOCATION DETAILED: RIGHT INFERIOR ANTERIOR NECK
LOCATION DETAILED: LEFT SUPERIOR CENTRAL BUCCAL CHEEK
LOCATION DETAILED: LEFT LATERAL INFERIOR EYELID
LOCATION DETAILED: LEFT PROXIMAL POSTERIOR UPPER ARM
LOCATION DETAILED: LEFT POSTERIOR SHOULDER
LOCATION DETAILED: LEFT CENTRAL LATERAL NECK
LOCATION DETAILED: LEFT INFERIOR LATERAL NECK
LOCATION DETAILED: RIGHT CLAVICULAR NECK
LOCATION DETAILED: RIGHT LATERAL SUPERIOR CHEST
LOCATION DETAILED: LEFT SUPERIOR ANTERIOR NECK
LOCATION DETAILED: LEFT ANTERIOR SHOULDER
LOCATION DETAILED: LEFT CENTRAL BUCCAL CHEEK
LOCATION DETAILED: LEFT INFERIOR ANTERIOR NECK
LOCATION DETAILED: RIGHT ANTERIOR SHOULDER
LOCATION DETAILED: RIGHT INFERIOR LATERAL NECK
LOCATION DETAILED: LEFT CLAVICULAR NECK
LOCATION DETAILED: RIGHT CENTRAL LATERAL NECK

## 2023-04-20 ASSESSMENT — LOCATION ZONE DERM
LOCATION ZONE: EYELID
LOCATION ZONE: ARM
LOCATION ZONE: FACE
LOCATION ZONE: TRUNK
LOCATION ZONE: NECK

## 2023-04-20 ASSESSMENT — LOCATION SIMPLE DESCRIPTION DERM
LOCATION SIMPLE: CHEST
LOCATION SIMPLE: LEFT CHEEK
LOCATION SIMPLE: LEFT UPPER ARM
LOCATION SIMPLE: NECK
LOCATION SIMPLE: LEFT SHOULDER
LOCATION SIMPLE: LEFT ANTERIOR NECK
LOCATION SIMPLE: RIGHT ANTERIOR NECK
LOCATION SIMPLE: LEFT INFERIOR EYELID
LOCATION SIMPLE: RIGHT SHOULDER

## 2023-04-20 NOTE — PROCEDURE: BENIGN DESTRUCTION COSMETIC
Detail Level: Detailed
Anesthesia Volume In Cc: 0.5
Consent: The patient's consent was obtained including but not limited to risks of crusting, scabbing, blistering, scarring, darker or lighter pigmentary change, recurrence, incomplete removal and infection.
Post-Care Instructions: I reviewed with the patient in detail post-care instructions. Patient is to wear sunprotection, and avoid picking at any of the treated lesions. Pt may apply Vaseline to crusted or scabbing areas.
Price (Use Numbers Only, No Special Characters Or $): 190.40

## 2023-06-01 ENCOUNTER — APPOINTMENT (OUTPATIENT)
Dept: URBAN - METROPOLITAN AREA CLINIC 203 | Age: 33
Setting detail: DERMATOLOGY
End: 2023-07-01

## 2023-06-01 DIAGNOSIS — D485 NEOPLASM OF UNCERTAIN BEHAVIOR OF SKIN: ICD-10-CM

## 2023-06-01 PROBLEM — D48.5 NEOPLASM OF UNCERTAIN BEHAVIOR OF SKIN: Status: ACTIVE | Noted: 2023-06-01

## 2023-06-01 PROCEDURE — OTHER PHOTO-DOCUMENTATION: OTHER

## 2023-06-01 PROCEDURE — 11103 TANGNTL BX SKIN EA SEP/ADDL: CPT

## 2023-06-01 PROCEDURE — OTHER BIOPSY BY SHAVE METHOD: OTHER

## 2023-06-01 PROCEDURE — 11102 TANGNTL BX SKIN SINGLE LES: CPT

## 2023-06-01 ASSESSMENT — LOCATION DETAILED DESCRIPTION DERM
LOCATION DETAILED: LEFT LATERAL ABDOMEN
LOCATION DETAILED: PERIUMBILICAL SKIN

## 2023-06-01 ASSESSMENT — LOCATION SIMPLE DESCRIPTION DERM: LOCATION SIMPLE: ABDOMEN

## 2023-06-01 ASSESSMENT — LOCATION ZONE DERM: LOCATION ZONE: TRUNK

## 2023-06-29 ENCOUNTER — APPOINTMENT (OUTPATIENT)
Dept: URBAN - METROPOLITAN AREA CLINIC 203 | Age: 33
Setting detail: DERMATOLOGY
End: 2023-06-30

## 2023-06-29 DIAGNOSIS — L30.1 DYSHIDROSIS [POMPHOLYX]: ICD-10-CM

## 2023-06-29 DIAGNOSIS — L24 IRRITANT CONTACT DERMATITIS: ICD-10-CM

## 2023-06-29 DIAGNOSIS — L73.8 OTHER SPECIFIED FOLLICULAR DISORDERS: ICD-10-CM

## 2023-06-29 PROBLEM — L24.9 IRRITANT CONTACT DERMATITIS, UNSPECIFIED CAUSE: Status: ACTIVE | Noted: 2023-06-29

## 2023-06-29 PROCEDURE — OTHER COUNSELING: OTHER

## 2023-06-29 PROCEDURE — OTHER PRESCRIPTION MEDICATION MANAGEMENT: OTHER

## 2023-06-29 PROCEDURE — OTHER BENIGN DESTRUCTION COSMETIC: OTHER

## 2023-06-29 PROCEDURE — OTHER PRESCRIPTION: OTHER

## 2023-06-29 PROCEDURE — 99213 OFFICE O/P EST LOW 20 MIN: CPT

## 2023-06-29 RX ORDER — TRIAMCINOLONE ACETONIDE 1 MG/G
CREAM TOPICAL BID
Qty: 80 | Refills: 3 | Status: ERX | COMMUNITY
Start: 2023-06-29

## 2023-06-29 ASSESSMENT — LOCATION DETAILED DESCRIPTION DERM
LOCATION DETAILED: NASAL SUPRATIP
LOCATION DETAILED: RIGHT PROXIMAL PRETIBIAL REGION
LOCATION DETAILED: RIGHT RADIAL DORSAL HAND
LOCATION DETAILED: NASAL DORSUM
LOCATION DETAILED: LEFT ULNAR DORSAL HAND

## 2023-06-29 ASSESSMENT — LOCATION SIMPLE DESCRIPTION DERM
LOCATION SIMPLE: RIGHT HAND
LOCATION SIMPLE: NOSE
LOCATION SIMPLE: LEFT HAND
LOCATION SIMPLE: RIGHT PRETIBIAL REGION

## 2023-06-29 ASSESSMENT — LOCATION ZONE DERM
LOCATION ZONE: HAND
LOCATION ZONE: NOSE
LOCATION ZONE: LEG

## 2023-06-29 NOTE — PROCEDURE: BENIGN DESTRUCTION COSMETIC
Post-Care Instructions: I reviewed with the patient in detail post-care instructions. Patient is to wear sunprotection, and avoid picking at any of the treated lesions. Pt may apply Vaseline to crusted or scabbing areas.
Consent: The patient's consent was obtained including but not limited to risks of crusting, scabbing, blistering, scarring, darker or lighter pigmentary change, recurrence, incomplete removal and infection.
Anesthesia Volume In Cc: 0.5
Detail Level: Detailed
Price (Use Numbers Only, No Special Characters Or $): 152.60

## 2023-06-29 NOTE — PROCEDURE: PRESCRIPTION MEDICATION MANAGEMENT
Render In Strict Bullet Format?: No
Detail Level: Zone
Initiate Treatment: Triamcinolone bid prn
Initiate Treatment: Triamcinolone x 2 weeks then prn

## 2023-09-01 DIAGNOSIS — Z01.419 WELL WOMAN EXAM WITH ROUTINE GYNECOLOGICAL EXAM: ICD-10-CM

## 2023-09-05 RX ORDER — NORETHINDRONE ACETATE AND ETHINYL ESTRADIOL AND FERROUS FUMARATE 1MG-20(21)
1 KIT ORAL DAILY
Qty: 84 TABLET | Refills: 3 | Status: SHIPPED | OUTPATIENT
Start: 2023-09-05 | End: 2024-02-27

## 2023-11-10 ENCOUNTER — APPOINTMENT (OUTPATIENT)
Dept: URBAN - METROPOLITAN AREA CLINIC 203 | Age: 33
Setting detail: DERMATOLOGY
End: 2023-11-21

## 2023-11-10 DIAGNOSIS — L91.8 OTHER HYPERTROPHIC DISORDERS OF THE SKIN: ICD-10-CM

## 2023-11-10 DIAGNOSIS — L73.8 OTHER SPECIFIED FOLLICULAR DISORDERS: ICD-10-CM

## 2023-11-10 DIAGNOSIS — D22 MELANOCYTIC NEVI: ICD-10-CM

## 2023-11-10 PROBLEM — D22.39 MELANOCYTIC NEVI OF OTHER PARTS OF FACE: Status: ACTIVE | Noted: 2023-11-10

## 2023-11-10 PROCEDURE — 99212 OFFICE O/P EST SF 10 MIN: CPT | Mod: 25

## 2023-11-10 PROCEDURE — 11310 SHAVE SKIN LESION 0.5 CM/<: CPT

## 2023-11-10 PROCEDURE — 11310 SHAVE SKIN LESION 0.5 CM/<: CPT | Mod: 76

## 2023-11-10 PROCEDURE — OTHER PRESCRIPTION MEDICATION MANAGEMENT: OTHER

## 2023-11-10 PROCEDURE — OTHER BENIGN DESTRUCTION COSMETIC: OTHER

## 2023-11-10 PROCEDURE — OTHER SHAVE REMOVAL (NO PATHOLOGY): OTHER

## 2023-11-10 ASSESSMENT — LOCATION SIMPLE DESCRIPTION DERM
LOCATION SIMPLE: LEFT CHEEK
LOCATION SIMPLE: GLABELLA
LOCATION SIMPLE: NECK
LOCATION SIMPLE: RIGHT CHEEK
LOCATION SIMPLE: LEFT ANTERIOR NECK

## 2023-11-10 ASSESSMENT — LOCATION ZONE DERM
LOCATION ZONE: FACE
LOCATION ZONE: NECK

## 2023-11-10 ASSESSMENT — LOCATION DETAILED DESCRIPTION DERM
LOCATION DETAILED: RIGHT MID PREAURICULAR CHEEK
LOCATION DETAILED: LEFT CLAVICULAR NECK
LOCATION DETAILED: LEFT INFERIOR LATERAL NECK
LOCATION DETAILED: GLABELLA
LOCATION DETAILED: LEFT INFERIOR ANTERIOR NECK
LOCATION DETAILED: LEFT LATERAL MANDIBULAR CHEEK

## 2023-11-10 NOTE — PROCEDURE: BENIGN DESTRUCTION COSMETIC
Detail Level: Detailed
Price (Use Numbers Only, No Special Characters Or $): 152.60
Anesthesia Volume In Cc: 0.5
Post-Care Instructions: I reviewed with the patient in detail post-care instructions. Patient is to wear sunprotection, and avoid picking at any of the treated lesions. Pt may apply Vaseline to crusted or scabbing areas.
Consent: The patient's consent was obtained including but not limited to risks of crusting, scabbing, blistering, scarring, darker or lighter pigmentary change, recurrence, incomplete removal and infection.

## 2024-02-27 ENCOUNTER — OFFICE VISIT (OUTPATIENT)
Dept: FAMILY MEDICINE | Facility: CLINIC | Age: 34
End: 2024-02-27
Payer: COMMERCIAL

## 2024-02-27 VITALS
DIASTOLIC BLOOD PRESSURE: 68 MMHG | OXYGEN SATURATION: 99 % | TEMPERATURE: 97.3 F | BODY MASS INDEX: 23.23 KG/M2 | SYSTOLIC BLOOD PRESSURE: 118 MMHG | HEART RATE: 91 BPM | WEIGHT: 148 LBS | HEIGHT: 67 IN

## 2024-02-27 DIAGNOSIS — L20.84 INTRINSIC ECZEMA: ICD-10-CM

## 2024-02-27 DIAGNOSIS — E78.00 ELEVATED LDL CHOLESTEROL LEVEL: ICD-10-CM

## 2024-02-27 DIAGNOSIS — Z00.00 ENCOUNTER FOR PREVENTIVE CARE: Primary | ICD-10-CM

## 2024-02-27 DIAGNOSIS — Z11.3 ROUTINE SCREENING FOR STI (SEXUALLY TRANSMITTED INFECTION): ICD-10-CM

## 2024-02-27 PROCEDURE — 3008F BODY MASS INDEX DOCD: CPT | Performed by: STUDENT IN AN ORGANIZED HEALTH CARE EDUCATION/TRAINING PROGRAM

## 2024-02-27 PROCEDURE — 99395 PREV VISIT EST AGE 18-39: CPT | Performed by: STUDENT IN AN ORGANIZED HEALTH CARE EDUCATION/TRAINING PROGRAM

## 2024-02-27 RX ORDER — TRIAMCINOLONE ACETONIDE 1 MG/G
1 OINTMENT TOPICAL 2 TIMES DAILY
Qty: 30 G | Refills: 1 | Status: SHIPPED | OUTPATIENT
Start: 2024-02-27 | End: 2025-03-03 | Stop reason: SDUPTHER

## 2024-02-27 ASSESSMENT — PATIENT HEALTH QUESTIONNAIRE - PHQ9: SUM OF ALL RESPONSES TO PHQ9 QUESTIONS 1 & 2: 0

## 2024-02-27 NOTE — PROGRESS NOTES
Subjective      Patient ID: Jacquie Jewell is a 33 y.o. female here for the following:   Annual Exam      HPI    SUBJECTIVE:   33 y.o. female for annual routine checkup.    Reports increased itching in hands   Saw dermatologist who prescribed steroid cream for hand eczema   Has noticed more itching radiating from hands into torso and small acne bumps on forehead  Dermatologist recommended retinol for forehead, pt plans to see dermatologist again     Specific diet: Standard American diet  Exercise: [employer] class once a week, used to walk more during the pandemic and would like to restart  Sleep hours per night: Adequate  Dentist twice yearly: Yes  Optometrist or Opthalmologist annually?  Yes    Vaccines:  COVID? Not UTD  Influenza? Not UTD    Do you wear your seatbelt? y  Do you wear sunscreen? y  Smoke detectors? y  Feel safe in your home? y    History of abnormal pap?  No   Did you receive HPV vaccine? y     Desire STI testing? No     History of abnormal mammogram? N/A     Off birth control since September  Reports regular menstrual periods since     Immunization History   Administered Date(s) Administered   • Influenza Vaccine Quadrivalent Preservative Free 3 Yr And Up 10/10/2020   • Influenza, Unspecified 10/06/2021   • SARS-COV-2 (COVID19) VACCINE, PFIZER MONOVALENT 2021, 2021   • Tdap 2016      Menstrual History:  OB History        0    Para   0    Term   0       0    AB   0    Living   0       SAB   0    IAB   0    Ectopic   0    Multiple   0    Live Births   0                No LMP recorded. (Menstrual status: Amenorrhea).       The following have been reviewed and updated as appropriate in this visit:      Allergies  Meds  Problems  Social History      Patient Active Problem List   Diagnosis   • Encounter for preventive care   • Elevated LDL cholesterol level   • Intrinsic eczema    .    Social History     Tobacco Use   • Smoking status: Never   • Smokeless tobacco: Never  "  Vaping Use   • Vaping Use: Never used   Substance Use Topics   • Alcohol use: Yes     Comment: occasionally   • Drug use: No     Social History     Social History Narrative    [employer] class 1x a week        Family History   Problem Relation Age of Onset   • Sjogren's syndrome Biological Mother         trying to get into   • Hyperlipidemia Biological Father    • Hyperlipidemia Biological Brother    • Diabetes Maternal Grandmother    • Heart attack Maternal Grandmother    • Cancer Paternal Grandmother    • Breast cancer Paternal Grandmother    • Colon cancer Neg Hx    • Ovarian cancer Neg Hx        Allergies as of 02/27/2024 - Reviewed 02/27/2024   Allergen Reaction Noted   • Cephalexin Hives        Current Outpatient Medications   Medication Sig Dispense Refill   • triamcinolone (KENALOG) 0.1 % ointment Apply 1 Application topically 2 (two) times a day. 30 g 1     No current facility-administered medications for this visit.       Review of Systems    ROS:  Feeling well. No dyspnea or chest pain on exertion.  No abdominal pain, change in bowel habits, black or bloody stools.  No urinary tract symptoms. No changes to hair, skin, or nails. No neurological complaints.    Objective   Vitals:   Vitals:    02/27/24 1503   BP: 118/68   BP Location: Left upper arm   Patient Position: Sitting   Pulse: 91   Temp: 36.3 °C (97.3 °F)   TempSrc: Temporal   SpO2: 99%   Weight: 67.1 kg (148 lb)   Height: 1.702 m (5' 7\")     Body mass index is 23.18 kg/m².  No LMP recorded. (Menstrual status: Amenorrhea).    No results found.    Physical Exam    The patient appears well, alert, oriented x 3, in no distress.  ENT normal.  Neck supple. No adenopathy or thyromegaly.  FARNAZ. Sclera anicteric.  No ocular discharge, pink conjunctiva  Lungs are clear, good air entry, no wheezes, rhonchi or rales.   S1 and S2 normal, no murmurs, regular rate and rhythm.  Abdomen soft without tenderness, guarding, mass or organomegaly.   Extremities show no " edema, normal peripheral pulses.   Neurological is normal, no focal findings. CN 2-12 intact. Moves all extremities spontaneously.    Psych: pleasant and cooperative    ASSESSMENT & PLAN    Problem List Items Addressed This Visit        Infectious/Inflammatory    Intrinsic eczema     Prescribed Kenalog cream   Follow-up with dermatologist         Relevant Medications    triamcinolone (KENALOG) 0.1 % ointment       Other    Encounter for preventive care - Primary     ASSESSMENT:   well woman    PLAN:   Mammogram at 38 yo   CRC screen at 43 yo   pap smear- per ob   counseled on breast self exam and family planning choices  Recommended she take prenatal vitamins  additional lab tests per orders  return annually or prn  Counseled on importance of regular exercise including both cardio and resistance training and necessity of varied diet and regular exercise           Relevant Orders    Comprehensive metabolic panel    CBC and Differential    Lipid panel    Hepatitis C antibody    RPR    HIV 1,2 AB P24 AG    Chlamydia/GC RNA:ThinPrep/Urine/Swab    Elevated LDL cholesterol level     Stable   Check labs         Relevant Orders    Comprehensive metabolic panel    CBC and Differential    Lipid panel   Other Visit Diagnoses     Routine screening for STI (sexually transmitted infection)        Relevant Orders    Hepatitis C antibody    RPR    HIV 1,2 AB P24 AG    Chlamydia/GC RNA:ThinPrep/Urine/Swab        Patient Instructions   Diet: Eat a balanced diet of fruits, vegetables, and protein such as lean meats, fish, nuts, beans, quinoa. Limit your intake of carbs such as bread and pasta, and when you do eat these, try to stick with whole grains with higher amounts of fiber. When cooking, try to use olive oil instead of butter, which is high in saturated fat.     Prenatal with Vit d 8577-5549 units daily + folic acid 0.4 mcg     Goal Exercise: Aim to exercise 3-5 days per week for 30-60 minutes at moderate intensity. Some  suggestions include cardio, weight training, and swimming, but the most important thing is flexibility: do whatever you like and enjoy so that you will keep doing it. The recommended minimums for weekly aerobic activity include 150 minutes of  moderate-intensity aerobic activity such as brisk walking, 75 minutes of vigorous-intensity aerobic activity such as jogging or running, or an equivalent mix of moderate- and vigorous-intensity aerobic activity. In addition, muscle-strengthening activities that work all major muscle groups (legs, hips, back, abdomen, chest, shoulders, and arms) should be performed 2 or more days/week.    Skin: Always use sunscreen with SPF>15 when exposed to the sun. Try to avoid prolonged exposure. Check skin moles regularly, and you may want to see a dermatologist regularly.    Health maintenance: Visit your dentist every 6 months for cleanings and dental exams. Visit your optometrist or ophthalmologist every year for an eye exam.    Safety: seatbelts, helmets, and no texting while driving.        By signing my name below, I, Lalitha Jernigan, attest that this documentation has been prepared under the direction and in the presence of Karen Finnegan DO      2/27/2024 3:30 PM  Lalitha Jernigan       This note was completed in part utilizing a voice recognition software and in part with a scribe. Grammatical errors, random word insertion, spelling mistakes, and incomplete sentences may be an occasional consequence of the system secondary to software limitations, ambient noise and hardware issues. Please read the chart carefully and recognize, using context, where substitutions have occurred. If you have any questions or concerns about the context, text or information contained within the body of this dictation, please contact me, the provider, for further clarification.      Karen Finnegan D.O.  Family Medicine at [facility] 02/27/24

## 2024-02-27 NOTE — PATIENT INSTRUCTIONS
Diet: Eat a balanced diet of fruits, vegetables, and protein such as lean meats, fish, nuts, beans, quinoa. Limit your intake of carbs such as bread and pasta, and when you do eat these, try to stick with whole grains with higher amounts of fiber. When cooking, try to use olive oil instead of butter, which is high in saturated fat.     Prenatal with Vit d 4704-6797 units daily + folic acid 0.4 mcg     Goal Exercise: Aim to exercise 3-5 days per week for 30-60 minutes at moderate intensity. Some suggestions include cardio, weight training, and swimming, but the most important thing is flexibility: do whatever you like and enjoy so that you will keep doing it. The recommended minimums for weekly aerobic activity include 150 minutes of  moderate-intensity aerobic activity such as brisk walking, 75 minutes of vigorous-intensity aerobic activity such as jogging or running, or an equivalent mix of moderate- and vigorous-intensity aerobic activity. In addition, muscle-strengthening activities that work all major muscle groups (legs, hips, back, abdomen, chest, shoulders, and arms) should be performed 2 or more days/week.    Skin: Always use sunscreen with SPF>15 when exposed to the sun. Try to avoid prolonged exposure. Check skin moles regularly, and you may want to see a dermatologist regularly.    Health maintenance: Visit your dentist every 6 months for cleanings and dental exams. Visit your optometrist or ophthalmologist every year for an eye exam.    Safety: seatbelts, helmets, and no texting while driving.

## 2024-02-27 NOTE — ASSESSMENT & PLAN NOTE
ASSESSMENT:   well woman    PLAN:   Mammogram at 40 yo   CRC screen at 45 yo   pap smear- per ob   counseled on breast self exam and family planning choices  Recommended she take prenatal vitamins  additional lab tests per orders  return annually or prn  Counseled on importance of regular exercise including both cardio and resistance training and necessity of varied diet and regular exercise

## 2024-03-13 LAB
HCV IGG SERPL QL IA: NON REACTIVE
HIV 1+2 AB+HIV1 P24 AG SERPL QL IA: NON REACTIVE
RPR SER QL: NON REACTIVE

## 2024-03-14 LAB
C TRACH RRNA SPEC QL NAA+PROBE: NEGATIVE
N GONORRHOEA RRNA SPEC QL NAA+PROBE: NEGATIVE

## 2024-04-10 LAB
BASOPHILS # BLD AUTO: 0 X10E3/UL (ref 0–0.2)
BASOPHILS NFR BLD AUTO: 1 %
EOSINOPHIL # BLD AUTO: 0.1 X10E3/UL (ref 0–0.4)
EOSINOPHIL NFR BLD AUTO: 2 %
ERYTHROCYTE [DISTWIDTH] IN BLOOD BY AUTOMATED COUNT: 11.9 % (ref 11.7–15.4)
HCT VFR BLD AUTO: 42.8 % (ref 34–46.6)
HGB BLD-MCNC: 13.9 G/DL (ref 11.1–15.9)
IMM GRANULOCYTES # BLD AUTO: 0 X10E3/UL (ref 0–0.1)
IMM GRANULOCYTES NFR BLD AUTO: 0 %
LYMPHOCYTES # BLD AUTO: 1.8 X10E3/UL (ref 0.7–3.1)
LYMPHOCYTES NFR BLD AUTO: 34 %
MCH RBC QN AUTO: 30.2 PG (ref 26.6–33)
MCHC RBC AUTO-ENTMCNC: 32.5 G/DL (ref 31.5–35.7)
MCV RBC AUTO: 93 FL (ref 79–97)
MONOCYTES # BLD AUTO: 0.4 X10E3/UL (ref 0.1–0.9)
MONOCYTES NFR BLD AUTO: 7 %
NEUTROPHILS # BLD AUTO: 3 X10E3/UL (ref 1.4–7)
NEUTROPHILS NFR BLD AUTO: 56 %
PLATELET # BLD AUTO: 278 X10E3/UL (ref 150–450)
RBC # BLD AUTO: 4.6 X10E6/UL (ref 3.77–5.28)
WBC # BLD AUTO: 5.4 X10E3/UL (ref 3.4–10.8)

## 2024-04-11 LAB
ALBUMIN SERPL-MCNC: 4.6 G/DL (ref 3.9–4.9)
ALBUMIN/GLOB SERPL: 1.8 {RATIO} (ref 1.2–2.2)
ALP SERPL-CCNC: 64 IU/L (ref 44–121)
ALT SERPL-CCNC: 17 IU/L (ref 0–32)
AST SERPL-CCNC: 21 IU/L (ref 0–40)
BILIRUB SERPL-MCNC: 0.8 MG/DL (ref 0–1.2)
BUN SERPL-MCNC: 13 MG/DL (ref 6–20)
BUN/CREAT SERPL: 16 (ref 9–23)
CALCIUM SERPL-MCNC: 9.4 MG/DL (ref 8.7–10.2)
CHLORIDE SERPL-SCNC: 103 MMOL/L (ref 96–106)
CHOLEST SERPL-MCNC: 184 MG/DL (ref 100–199)
CO2 SERPL-SCNC: 21 MMOL/L (ref 20–29)
CREAT SERPL-MCNC: 0.82 MG/DL (ref 0.57–1)
EGFRCR SERPLBLD CKD-EPI 2021: 96 ML/MIN/1.73
GLOBULIN SER CALC-MCNC: 2.5 G/DL (ref 1.5–4.5)
GLUCOSE SERPL-MCNC: 82 MG/DL (ref 70–99)
HDLC SERPL-MCNC: 54 MG/DL
LDLC SERPL CALC-MCNC: 116 MG/DL (ref 0–99)
POTASSIUM SERPL-SCNC: 4.3 MMOL/L (ref 3.5–5.2)
PROT SERPL-MCNC: 7.1 G/DL (ref 6–8.5)
SODIUM SERPL-SCNC: 138 MMOL/L (ref 134–144)
TRIGL SERPL-MCNC: 78 MG/DL (ref 0–149)
VLDLC SERPL CALC-MCNC: 14 MG/DL (ref 5–40)

## 2024-04-29 ENCOUNTER — APPOINTMENT (OUTPATIENT)
Dept: URBAN - METROPOLITAN AREA CLINIC 203 | Age: 34
Setting detail: DERMATOLOGY
End: 2024-04-30

## 2024-04-29 DIAGNOSIS — L91.8 OTHER HYPERTROPHIC DISORDERS OF THE SKIN: ICD-10-CM

## 2024-04-29 DIAGNOSIS — L73.8 OTHER SPECIFIED FOLLICULAR DISORDERS: ICD-10-CM

## 2024-04-29 DIAGNOSIS — L82.1 OTHER SEBORRHEIC KERATOSIS: ICD-10-CM

## 2024-04-29 PROCEDURE — OTHER BENIGN DESTRUCTION COSMETIC: OTHER

## 2024-04-29 ASSESSMENT — LOCATION DETAILED DESCRIPTION DERM
LOCATION DETAILED: RIGHT CENTRAL LATERAL NECK
LOCATION DETAILED: LEFT MEDIAL SUPERIOR CHEST
LOCATION DETAILED: LEFT SUPERIOR TEMPLE
LOCATION DETAILED: LEFT INFERIOR ANTERIOR NECK
LOCATION DETAILED: STERNAL NOTCH
LOCATION DETAILED: RIGHT MEDIAL SUPERIOR CHEST
LOCATION DETAILED: RIGHT INFERIOR MEDIAL MIDBACK
LOCATION DETAILED: UPPER STERNUM
LOCATION DETAILED: LEFT CLAVICULAR NECK
LOCATION DETAILED: RIGHT INFERIOR LATERAL NECK
LOCATION DETAILED: RIGHT FOREHEAD
LOCATION DETAILED: LEFT INFERIOR LATERAL NECK
LOCATION DETAILED: RIGHT CLAVICULAR SKIN
LOCATION DETAILED: LEFT SUPERIOR FOREHEAD
LOCATION DETAILED: LEFT CENTRAL TEMPLE
LOCATION DETAILED: LEFT CENTRAL LATERAL NECK
LOCATION DETAILED: LEFT LATERAL ZYGOMA
LOCATION DETAILED: LEFT INFERIOR LATERAL FOREHEAD
LOCATION DETAILED: LEFT LATERAL FOREHEAD
LOCATION DETAILED: RIGHT INFERIOR ANTERIOR NECK
LOCATION DETAILED: LEFT FOREHEAD
LOCATION DETAILED: MIDDLE STERNUM
LOCATION DETAILED: LEFT INFERIOR CENTRAL MALAR CHEEK
LOCATION DETAILED: RIGHT CLAVICULAR NECK
LOCATION DETAILED: LEFT LATERAL SUPERIOR CHEST
LOCATION DETAILED: LEFT INFERIOR LATERAL MALAR CHEEK

## 2024-04-29 ASSESSMENT — LOCATION SIMPLE DESCRIPTION DERM
LOCATION SIMPLE: RIGHT FOREHEAD
LOCATION SIMPLE: NECK
LOCATION SIMPLE: LEFT ZYGOMA
LOCATION SIMPLE: RIGHT ANTERIOR NECK
LOCATION SIMPLE: RIGHT CLAVICULAR SKIN
LOCATION SIMPLE: LEFT TEMPLE
LOCATION SIMPLE: LEFT FOREHEAD
LOCATION SIMPLE: CHEST
LOCATION SIMPLE: RIGHT LOWER BACK
LOCATION SIMPLE: LEFT ANTERIOR NECK
LOCATION SIMPLE: LEFT CHEEK

## 2024-04-29 ASSESSMENT — LOCATION ZONE DERM
LOCATION ZONE: FACE
LOCATION ZONE: TRUNK
LOCATION ZONE: NECK

## 2024-04-29 NOTE — PROCEDURE: BENIGN DESTRUCTION COSMETIC
Detail Level: Detailed
Consent: The patient's consent was obtained including but not limited to risks of crusting, scabbing, blistering, scarring, darker or lighter pigmentary change, recurrence, incomplete removal and infection.
Post-Care Instructions: I reviewed with the patient in detail post-care instructions. Patient is to wear sunprotection, and avoid picking at any of the treated lesions. Pt may apply Vaseline to crusted or scabbing areas.
Anesthesia Volume In Cc: 0.5
Price (Use Numbers Only, No Special Characters Or $): 0
Price (Use Numbers Only, No Special Characters Or $): 190.40

## 2024-08-27 ENCOUNTER — APPOINTMENT (OUTPATIENT)
Dept: URBAN - METROPOLITAN AREA CLINIC 203 | Age: 34
Setting detail: DERMATOLOGY
End: 2024-08-29

## 2024-08-27 DIAGNOSIS — L82.1 OTHER SEBORRHEIC KERATOSIS: ICD-10-CM

## 2024-08-27 PROCEDURE — OTHER BENIGN DESTRUCTION COSMETIC: OTHER

## 2024-08-27 ASSESSMENT — LOCATION ZONE DERM: LOCATION ZONE: ARM

## 2024-08-27 ASSESSMENT — LOCATION DETAILED DESCRIPTION DERM: LOCATION DETAILED: LEFT ANTERIOR SHOULDER

## 2024-08-27 ASSESSMENT — LOCATION SIMPLE DESCRIPTION DERM: LOCATION SIMPLE: LEFT SHOULDER

## 2024-08-27 NOTE — PROCEDURE: BENIGN DESTRUCTION COSMETIC
Anesthesia Volume In Cc: 0.5
Consent: The patient's consent was obtained including but not limited to risks of crusting, scabbing, blistering, scarring, darker or lighter pigmentary change, recurrence, incomplete removal and infection.
Price (Use Numbers Only, No Special Characters Or $): 190.40
Post-Care Instructions: I reviewed with the patient in detail post-care instructions. Patient is to wear sunprotection, and avoid picking at any of the treated lesions. Pt may apply Vaseline to crusted or scabbing areas.
Detail Level: Detailed

## 2025-01-07 ENCOUNTER — APPOINTMENT (OUTPATIENT)
Dept: URBAN - METROPOLITAN AREA CLINIC 203 | Age: 35
Setting detail: DERMATOLOGY
End: 2025-01-07

## 2025-01-07 DIAGNOSIS — L82.1 OTHER SEBORRHEIC KERATOSIS: ICD-10-CM

## 2025-01-07 DIAGNOSIS — L73.8 OTHER SPECIFIED FOLLICULAR DISORDERS: ICD-10-CM

## 2025-01-07 PROCEDURE — OTHER BENIGN DESTRUCTION COSMETIC: OTHER

## 2025-01-07 PROCEDURE — OTHER LIQUID NITROGEN (COSMETIC): OTHER

## 2025-01-07 ASSESSMENT — LOCATION DETAILED DESCRIPTION DERM
LOCATION DETAILED: LEFT NASOLABIAL FOLD
LOCATION DETAILED: NASAL DORSUM
LOCATION DETAILED: LEFT FOREHEAD
LOCATION DETAILED: LEFT SUPERIOR CENTRAL MALAR CHEEK
LOCATION DETAILED: LEFT MENTAL CREASE
LOCATION DETAILED: RIGHT SUPERIOR ANTERIOR NECK
LOCATION DETAILED: RIGHT CENTRAL ZYGOMA
LOCATION DETAILED: LEFT INFERIOR LATERAL NECK
LOCATION DETAILED: LEFT CHIN
LOCATION DETAILED: RIGHT PROXIMAL LATERAL POSTERIOR UPPER ARM
LOCATION DETAILED: LEFT CENTRAL BUCCAL CHEEK
LOCATION DETAILED: LEFT CENTRAL MALAR CHEEK
LOCATION DETAILED: RIGHT LATERAL FOREHEAD
LOCATION DETAILED: SUPERIOR MID FOREHEAD
LOCATION DETAILED: LEFT CENTRAL TEMPLE
LOCATION DETAILED: RIGHT INFERIOR FOREHEAD
LOCATION DETAILED: LEFT SUPERIOR LATERAL NECK
LOCATION DETAILED: LEFT INFERIOR MEDIAL MALAR CHEEK
LOCATION DETAILED: RIGHT INFERIOR LATERAL NECK
LOCATION DETAILED: LEFT MEDIAL MALAR CHEEK
LOCATION DETAILED: RIGHT CENTRAL TEMPLE
LOCATION DETAILED: RIGHT POSTERIOR SHOULDER
LOCATION DETAILED: LEFT LATERAL ZYGOMA
LOCATION DETAILED: RIGHT INFERIOR ANTERIOR NECK
LOCATION DETAILED: RIGHT INFERIOR CENTRAL MALAR CHEEK

## 2025-01-07 ASSESSMENT — LOCATION SIMPLE DESCRIPTION DERM
LOCATION SIMPLE: RIGHT ZYGOMA
LOCATION SIMPLE: CHIN
LOCATION SIMPLE: RIGHT POSTERIOR UPPER ARM
LOCATION SIMPLE: RIGHT CHEEK
LOCATION SIMPLE: RIGHT SHOULDER
LOCATION SIMPLE: RIGHT TEMPLE
LOCATION SIMPLE: SUPERIOR FOREHEAD
LOCATION SIMPLE: LEFT LIP
LOCATION SIMPLE: LEFT ZYGOMA
LOCATION SIMPLE: RIGHT ANTERIOR NECK
LOCATION SIMPLE: LEFT TEMPLE
LOCATION SIMPLE: LEFT FOREHEAD
LOCATION SIMPLE: LEFT ANTERIOR NECK
LOCATION SIMPLE: RIGHT FOREHEAD
LOCATION SIMPLE: LEFT CHEEK
LOCATION SIMPLE: NOSE

## 2025-01-07 ASSESSMENT — LOCATION ZONE DERM
LOCATION ZONE: ARM
LOCATION ZONE: NECK
LOCATION ZONE: FACE
LOCATION ZONE: NOSE
LOCATION ZONE: LIP

## 2025-01-07 NOTE — PROCEDURE: LIQUID NITROGEN (COSMETIC)
Show Spray Paint Technique Variable?: Yes
Spray Paint Text: The liquid nitrogen was applied to the skin utilizing a spray paint frosting technique.
Billing Information: Bill by Static Price
Spray Paint Technique: No
Detail Level: Detailed
Post-Care Instructions: I reviewed with the patient in detail post-care instructions. Patient is to wear sunprotection, and avoid picking at any of the treated lesions. Pt may apply Vaseline to crusted or scabbing areas.
Consent: The patient's consent was obtained including but not limited to risks of crusting, scabbing, blistering, scarring, darker or lighter pigmentary change, recurrence, incomplete removal and infection. The patient understands that the procedure is cosmetic in nature and is not covered by insurance.

## 2025-01-07 NOTE — PROCEDURE: BENIGN DESTRUCTION COSMETIC
Consent: The patient's consent was obtained including but not limited to risks of crusting, scabbing, blistering, scarring, darker or lighter pigmentary change, recurrence, incomplete removal and infection.
Anesthesia Volume In Cc: 0.5
Detail Level: Detailed
Post-Care Instructions: I reviewed with the patient in detail post-care instructions. Patient is to wear sunprotection, and avoid picking at any of the treated lesions. Pt may apply Vaseline to crusted or scabbing areas.
Price (Use Numbers Only, No Special Characters Or $): 200

## 2025-02-06 ENCOUNTER — APPOINTMENT (OUTPATIENT)
Dept: URBAN - METROPOLITAN AREA CLINIC 203 | Age: 35
Setting detail: DERMATOLOGY
End: 2025-02-06

## 2025-02-06 DIAGNOSIS — L82.1 OTHER SEBORRHEIC KERATOSIS: ICD-10-CM

## 2025-02-06 PROCEDURE — OTHER CURETTAGE AND DESTRUCTION (COSMETIC): OTHER

## 2025-02-06 PROCEDURE — OTHER OTHER: OTHER

## 2025-02-06 ASSESSMENT — LOCATION ZONE DERM: LOCATION ZONE: ARM

## 2025-02-06 ASSESSMENT — LOCATION DETAILED DESCRIPTION DERM: LOCATION DETAILED: RIGHT PROXIMAL POSTERIOR UPPER ARM

## 2025-02-06 ASSESSMENT — LOCATION SIMPLE DESCRIPTION DERM: LOCATION SIMPLE: RIGHT POSTERIOR UPPER ARM

## 2025-02-06 NOTE — PROCEDURE: OTHER
Other (Free Text): No charge as lesion was previously treated cosmetically but did not resolve
Note Text (......Xxx Chief Complaint.): This diagnosis correlates with the
Render Risk Assessment In Note?: no
Detail Level: Zone

## 2025-02-06 NOTE — PROCEDURE: CURETTAGE AND DESTRUCTION (COSMETIC)
Detail Level: Detailed
Size Of Lesion After Curettage: 1.1
Anesthesia Type: 1% lidocaine with epinephrine
Cautery Type: electrodesiccation
Number Of Curettages: 3
Additional Information: (Optional): The wound was cleaned, and a pressure dressing was applied.  The patient received detailed post-op instructions.
Price (Use Numbers Only, No Special Characters Or $): 0
Consent was obtained from the patient. The risks, benefits and alternatives to therapy were discussed in detail. Specifically, the risks of infection, scarring, bleeding, prolonged wound healing, nerve injury, incomplete removal, allergy to anesthesia and recurrence were addressed. Alternatives to ED&C, such as: surgical removal and XRT were also discussed.  Prior to the procedure, the treatment site was clearly identified and confirmed by the patient. All components of Universal Protocol/PAUSE Rule completed.
Post-Care Instructions: I reviewed with the patient in detail post-care instructions. Patient is to keep the area dry for 48 hours, and not to engage in any swimming until the area is healed. Should the patient develop any fevers, chills, bleeding, severe pain patient will contact the office immediately.

## 2025-03-03 ENCOUNTER — OFFICE VISIT (OUTPATIENT)
Dept: FAMILY MEDICINE | Facility: CLINIC | Age: 35
End: 2025-03-03
Payer: COMMERCIAL

## 2025-03-03 VITALS
BODY MASS INDEX: 22.88 KG/M2 | TEMPERATURE: 98.2 F | OXYGEN SATURATION: 99 % | SYSTOLIC BLOOD PRESSURE: 124 MMHG | DIASTOLIC BLOOD PRESSURE: 74 MMHG | HEART RATE: 94 BPM | HEIGHT: 68 IN | RESPIRATION RATE: 16 BRPM | WEIGHT: 151 LBS

## 2025-03-03 DIAGNOSIS — Z00.00 ENCOUNTER FOR PREVENTIVE CARE: Primary | ICD-10-CM

## 2025-03-03 DIAGNOSIS — L20.84 INTRINSIC ECZEMA: ICD-10-CM

## 2025-03-03 DIAGNOSIS — Z11.3 ROUTINE SCREENING FOR STI (SEXUALLY TRANSMITTED INFECTION): ICD-10-CM

## 2025-03-03 DIAGNOSIS — E78.00 ELEVATED LDL CHOLESTEROL LEVEL: ICD-10-CM

## 2025-03-03 PROCEDURE — 99395 PREV VISIT EST AGE 18-39: CPT | Performed by: STUDENT IN AN ORGANIZED HEALTH CARE EDUCATION/TRAINING PROGRAM

## 2025-03-03 PROCEDURE — 3008F BODY MASS INDEX DOCD: CPT | Performed by: STUDENT IN AN ORGANIZED HEALTH CARE EDUCATION/TRAINING PROGRAM

## 2025-03-03 RX ORDER — TRIAMCINOLONE ACETONIDE 1 MG/G
1 OINTMENT TOPICAL 2 TIMES DAILY
Qty: 30 G | Refills: 1 | Status: SHIPPED | OUTPATIENT
Start: 2025-03-03 | End: 2025-04-02

## 2025-03-03 RX ORDER — AZELASTINE 1 MG/ML
1 SPRAY, METERED NASAL 2 TIMES DAILY PRN
Qty: 30 ML | Refills: 1 | Status: SHIPPED | OUTPATIENT
Start: 2025-03-03

## 2025-03-03 RX ORDER — AZELASTINE 1 MG/ML
SPRAY, METERED NASAL
COMMUNITY
Start: 2025-02-25 | End: 2025-03-03 | Stop reason: SDUPTHER

## 2025-03-03 ASSESSMENT — PATIENT HEALTH QUESTIONNAIRE - PHQ9: SUM OF ALL RESPONSES TO PHQ9 QUESTIONS 1 & 2: 0

## 2025-03-03 NOTE — PATIENT INSTRUCTIONS
Minimum 7-8K steps   Use stand goal as a move reminder   Water at goal     You should ideally be fasting for 8+ hours prior to the labs so we can get a good look at your sugar and cholesterol.  Please do not restrict liquid.  Black coffee or water only until labs are drawn.

## 2025-03-03 NOTE — PROGRESS NOTES
Subjective      Patient ID: Jacquie Jewell is a 34 y.o. female here for the following:   Annual Exam      HPI    SUBJECTIVE:   34 y.o. female for annual routine checkup.    Reports increased itching in hands   Saw dermatologist who prescribed steroid cream for hand eczema   Has noticed more itching radiating from hands into torso and small acne bumps on forehead  Dermatologist recommended retinol for forehead, pt plans to see dermatologist again     Specific diet: Standard American diet, Narda hoagies or deli meat a few times a week    1 hot dog a year    No red meat   Exercise: [employer] class once a week, used to walk more during the pandemic and would like to restart  Sleep hours per night: Adequate  Dentist twice yearly: Yes  Optometrist or Opthalmologist annually?  Yes    Vaccines:  COVID? Not UTD  Influenza? Not UTD    Do you wear your seatbelt? y  Do you wear sunscreen? y  Smoke detectors? y  Feel safe in your home? y    History of abnormal pap?  No   Did you receive HPV vaccine? y     Desire STI testing? No     History of abnormal mammogram? N/A     Off birth control since 2022  Reports regular menstrual periods since     Immunization History   Administered Date(s) Administered    Influenza Vaccine Trivalent Preservative Free 6 Mons And Up 10/10/2020    Influenza, Unspecified 10/06/2021    SARS-COV-2 (COVID19) VACCINE, PFIZER MONOVALENT 2021, 2021    Tdap 2016      Menstrual History:  OB History          0    Para   0    Term   0       0    AB   0    Living   0         SAB   0    IAB   0    Ectopic   0    Multiple   0    Live Births   0                No LMP recorded. (Menstrual status: Amenorrhea).       The following have been reviewed and updated as appropriate in this visit:      Allergies  Meds  Problems  Social History      Patient Active Problem List   Diagnosis    Encounter for preventive care    Elevated LDL cholesterol level    Intrinsic eczema     ".    Social History     Tobacco Use    Smoking status: Never    Smokeless tobacco: Never   Vaping Use    Vaping status: Never Used   Substance Use Topics    Alcohol use: Yes     Comment: occasionally    Drug use: No     Social History     Social History Narrative    [employer] class 1x a week      Family History   Problem Relation Name Age of Onset    Sjogren's syndrome Biological Mother          trying to get into    Hyperlipidemia Biological Father      Hyperlipidemia Biological Brother      Diabetes Maternal Grandmother      Heart attack Maternal Grandmother      Cancer Paternal Grandmother      Breast cancer Paternal Grandmother      Colon cancer Neg Hx      Ovarian cancer Neg Hx         Allergies as of 03/03/2025 - Reviewed 03/03/2025   Allergen Reaction Noted    Cephalexin Hives        Current Outpatient Medications   Medication Sig Dispense Refill    azelastine (ASTELIN) 137 mcg (0.1 %) nasal spray Administer 1 spray into each nostril 2 (two) times a day as needed for rhinitis. Use in each nostril as directed. 30 mL 1    triamcinolone (KENALOG) 0.1 % ointment Apply 1 Application topically 2 (two) times a day. 30 g 1     No current facility-administered medications for this visit.       Review of Systems    ROS:  Feeling well. No dyspnea or chest pain on exertion.  No abdominal pain, change in bowel habits, black or bloody stools.  No urinary tract symptoms. No changes to hair, skin, or nails. No neurological complaints.    Objective   Vitals:   Vitals:    03/03/25 1355   BP: 124/74   BP Location: Left upper arm   Patient Position: Sitting   Pulse: 94   Resp: 16   Temp: 36.8 °C (98.2 °F)   TempSrc: Skin   SpO2: 99%   Weight: 68.5 kg (151 lb)   Height: 1.727 m (5' 8\")     Body mass index is 22.96 kg/m².  No LMP recorded. (Menstrual status: Amenorrhea).    No results found.    Physical Exam    The patient appears well, alert, oriented x 3, in no distress.  ENT normal.  Neck supple. No adenopathy or " thyromegaly.  FARNAZ. Sclera anicteric.  No ocular discharge, pink conjunctiva  Lungs are clear, good air entry, no wheezes, rhonchi or rales.   S1 and S2 normal, no murmurs, regular rate and rhythm.  Abdomen soft without tenderness, guarding, mass or organomegaly.   Extremities show no edema, normal peripheral pulses.   Neurological is normal, no focal findings. CN 2-12 intact. Moves all extremities spontaneously.    Psych: pleasant and cooperative    ASSESSMENT & PLAN    Problem List Items Addressed This Visit          Infectious/Inflammatory    Intrinsic eczema    Relevant Medications    triamcinolone (KENALOG) 0.1 % ointment    azelastine (ASTELIN) 137 mcg (0.1 %) nasal spray    Other Relevant Orders    Comprehensive metabolic panel    CBC and Differential    Lipid panel       Other    Encounter for preventive care - Primary    Relevant Orders    Ambulatory referral to Obstetrics / Gynecology    Comprehensive metabolic panel    CBC and Differential    Lipid panel    Elevated LDL cholesterol level    Relevant Orders    Lipid panel     Patient Instructions   Minimum 7-8K steps   Use stand goal as a move reminder     Water at goal         This note was completed in part utilizing a voice recognition software and in part with a scribe. Grammatical errors, random word insertion, spelling mistakes, and incomplete sentences may be an occasional consequence of the system secondary to software limitations, ambient noise and hardware issues. Please read the chart carefully and recognize, using context, where substitutions have occurred. If you have any questions or concerns about the context, text or information contained within the body of this dictation, please contact me, the provider, for further clarification.      Karen Finnegan D.O.  Family Medicine at [facility] 03/03/25

## 2025-03-04 LAB
C TRACH RRNA SPEC QL NAA+PROBE: NEGATIVE
N GONORRHOEA RRNA SPEC QL NAA+PROBE: NEGATIVE

## 2025-03-05 LAB
C TRACH RRNA NPH QL NAA+PROBE: NEGATIVE
N GONORRHOEA RRNA NPH QL NAA+PROBE: NEGATIVE

## 2025-03-19 LAB
ALBUMIN SERPL-MCNC: 4.6 G/DL (ref 3.9–4.9)
ALP SERPL-CCNC: 66 IU/L (ref 44–121)
ALT SERPL-CCNC: 15 IU/L (ref 0–32)
AST SERPL-CCNC: 19 IU/L (ref 0–40)
BASOPHILS # BLD AUTO: 0.1 X10E3/UL (ref 0–0.2)
BASOPHILS NFR BLD AUTO: 1 %
BILIRUB SERPL-MCNC: 0.6 MG/DL (ref 0–1.2)
BUN SERPL-MCNC: 16 MG/DL (ref 6–20)
BUN/CREAT SERPL: 23 (ref 9–23)
CALCIUM SERPL-MCNC: 9.3 MG/DL (ref 8.7–10.2)
CHLORIDE SERPL-SCNC: 104 MMOL/L (ref 96–106)
CHOLEST SERPL-MCNC: 193 MG/DL (ref 100–199)
CO2 SERPL-SCNC: 20 MMOL/L (ref 20–29)
CREAT SERPL-MCNC: 0.69 MG/DL (ref 0.57–1)
EGFRCR SERPLBLD CKD-EPI 2021: 115 ML/MIN/1.73
EOSINOPHIL # BLD AUTO: 0.2 X10E3/UL (ref 0–0.4)
EOSINOPHIL NFR BLD AUTO: 4 %
ERYTHROCYTE [DISTWIDTH] IN BLOOD BY AUTOMATED COUNT: 11.8 % (ref 11.7–15.4)
GLOBULIN SER CALC-MCNC: 2.3 G/DL (ref 1.5–4.5)
GLUCOSE SERPL-MCNC: 82 MG/DL (ref 70–99)
HCT VFR BLD AUTO: 42.2 % (ref 34–46.6)
HDLC SERPL-MCNC: 58 MG/DL
HGB BLD-MCNC: 13.9 G/DL (ref 11.1–15.9)
IMM GRANULOCYTES # BLD AUTO: 0 X10E3/UL (ref 0–0.1)
IMM GRANULOCYTES NFR BLD AUTO: 0 %
LDLC SERPL CALC-MCNC: 121 MG/DL (ref 0–99)
LYMPHOCYTES # BLD AUTO: 1.8 X10E3/UL (ref 0.7–3.1)
LYMPHOCYTES NFR BLD AUTO: 39 %
MCH RBC QN AUTO: 30.5 PG (ref 26.6–33)
MCHC RBC AUTO-ENTMCNC: 32.9 G/DL (ref 31.5–35.7)
MCV RBC AUTO: 93 FL (ref 79–97)
MONOCYTES # BLD AUTO: 0.3 X10E3/UL (ref 0.1–0.9)
MONOCYTES NFR BLD AUTO: 7 %
NEUTROPHILS # BLD AUTO: 2.2 X10E3/UL (ref 1.4–7)
NEUTROPHILS NFR BLD AUTO: 49 %
PLATELET # BLD AUTO: 263 X10E3/UL (ref 150–450)
POTASSIUM SERPL-SCNC: 4.6 MMOL/L (ref 3.5–5.2)
PROT SERPL-MCNC: 6.9 G/DL (ref 6–8.5)
RBC # BLD AUTO: 4.56 X10E6/UL (ref 3.77–5.28)
SODIUM SERPL-SCNC: 139 MMOL/L (ref 134–144)
TRIGL SERPL-MCNC: 74 MG/DL (ref 0–149)
VLDLC SERPL CALC-MCNC: 14 MG/DL (ref 5–40)
WBC # BLD AUTO: 4.5 X10E3/UL (ref 3.4–10.8)

## 2025-03-21 ENCOUNTER — RESULTS FOLLOW-UP (OUTPATIENT)
Dept: FAMILY MEDICINE | Facility: CLINIC | Age: 35
End: 2025-03-21

## 2025-06-30 ENCOUNTER — APPOINTMENT (OUTPATIENT)
Dept: URBAN - METROPOLITAN AREA CLINIC 203 | Age: 35
Setting detail: DERMATOLOGY
End: 2025-07-01

## 2025-06-30 DIAGNOSIS — L73.8 OTHER SPECIFIED FOLLICULAR DISORDERS: ICD-10-CM

## 2025-06-30 DIAGNOSIS — L82.1 OTHER SEBORRHEIC KERATOSIS: ICD-10-CM

## 2025-06-30 PROCEDURE — OTHER BENIGN DESTRUCTION COSMETIC: OTHER

## 2025-06-30 ASSESSMENT — LOCATION DETAILED DESCRIPTION DERM
LOCATION DETAILED: RIGHT SUPERIOR MEDIAL MALAR CHEEK
LOCATION DETAILED: LEFT SUPERIOR LATERAL MALAR CHEEK
LOCATION DETAILED: LEFT FOREHEAD
LOCATION DETAILED: RIGHT CENTRAL POSTAURICULAR SKIN
LOCATION DETAILED: LEFT SUPERIOR CENTRAL MALAR CHEEK
LOCATION DETAILED: RIGHT SUPERIOR MEDIAL FOREHEAD
LOCATION DETAILED: RIGHT CENTRAL MALAR CHEEK
LOCATION DETAILED: LEFT SUPERIOR LATERAL BUCCAL CHEEK
LOCATION DETAILED: RIGHT CENTRAL LATERAL NECK
LOCATION DETAILED: RIGHT LATERAL SUPERIOR CHEST
LOCATION DETAILED: LEFT LATERAL MALAR CHEEK
LOCATION DETAILED: RIGHT FOREHEAD
LOCATION DETAILED: LEFT SUPERIOR FOREHEAD

## 2025-06-30 ASSESSMENT — LOCATION ZONE DERM
LOCATION ZONE: FACE
LOCATION ZONE: NECK
LOCATION ZONE: TRUNK
LOCATION ZONE: SCALP

## 2025-06-30 ASSESSMENT — LOCATION SIMPLE DESCRIPTION DERM
LOCATION SIMPLE: NECK
LOCATION SIMPLE: LEFT FOREHEAD
LOCATION SIMPLE: LEFT CHEEK
LOCATION SIMPLE: RIGHT FOREHEAD
LOCATION SIMPLE: SCALP
LOCATION SIMPLE: CHEST
LOCATION SIMPLE: RIGHT CHEEK